# Patient Record
Sex: FEMALE | Race: WHITE | NOT HISPANIC OR LATINO | Employment: OTHER | URBAN - METROPOLITAN AREA
[De-identification: names, ages, dates, MRNs, and addresses within clinical notes are randomized per-mention and may not be internally consistent; named-entity substitution may affect disease eponyms.]

---

## 2017-04-04 ENCOUNTER — TRANSCRIBE ORDERS (OUTPATIENT)
Dept: ADMINISTRATIVE | Facility: HOSPITAL | Age: 67
End: 2017-04-04

## 2017-04-04 DIAGNOSIS — R53.81 OTHER MALAISE: ICD-10-CM

## 2017-04-04 DIAGNOSIS — R06.09 OTHER FORMS OF DYSPNEA: Primary | ICD-10-CM

## 2017-04-04 DIAGNOSIS — R53.83 OTHER FATIGUE: ICD-10-CM

## 2017-04-05 ENCOUNTER — APPOINTMENT (OUTPATIENT)
Dept: GENERAL RADIOLOGY | Facility: HOSPITAL | Age: 67
End: 2017-04-05

## 2017-04-05 ENCOUNTER — HOSPITAL ENCOUNTER (EMERGENCY)
Facility: HOSPITAL | Age: 67
Discharge: HOME | End: 2017-04-05
Attending: EMERGENCY MEDICINE | Admitting: EMERGENCY MEDICINE

## 2017-04-05 VITALS
TEMPERATURE: 98.6 F | DIASTOLIC BLOOD PRESSURE: 77 MMHG | OXYGEN SATURATION: 94 % | HEART RATE: 87 BPM | HEIGHT: 67 IN | BODY MASS INDEX: 34.92 KG/M2 | RESPIRATION RATE: 14 BRPM | SYSTOLIC BLOOD PRESSURE: 133 MMHG | WEIGHT: 222.5 LBS

## 2017-04-05 DIAGNOSIS — R53.81 MALAISE AND FATIGUE: ICD-10-CM

## 2017-04-05 DIAGNOSIS — N39.0 URINARY TRACT INFECTION IN FEMALE: ICD-10-CM

## 2017-04-05 DIAGNOSIS — R06.02 SHORTNESS OF BREATH: Primary | ICD-10-CM

## 2017-04-05 DIAGNOSIS — R53.83 MALAISE AND FATIGUE: ICD-10-CM

## 2017-04-05 LAB
ALBUMIN SERPL-MCNC: 3.9 G/DL (ref 3.5–5.2)
ALBUMIN/GLOB SERPL: 1.2 G/DL
ALP SERPL-CCNC: 122 U/L (ref 40–129)
ALT SERPL W P-5'-P-CCNC: 17 U/L (ref 5–33)
ANION GAP SERPL CALCULATED.3IONS-SCNC: 14.7 MMOL/L
AST SERPL-CCNC: 19 U/L (ref 5–32)
BACTERIA UR QL AUTO: ABNORMAL /HPF
BASOPHILS # BLD AUTO: 0.06 10*3/MM3 (ref 0–0.2)
BASOPHILS NFR BLD AUTO: 0.5 % (ref 0–2)
BILIRUB SERPL-MCNC: 0.5 MG/DL (ref 0.2–1.2)
BILIRUB UR QL STRIP: NEGATIVE
BUN BLD-MCNC: 10 MG/DL (ref 8–23)
BUN/CREAT SERPL: 10.8 (ref 7–25)
CALCIUM SPEC-SCNC: 9.3 MG/DL (ref 8.8–10.5)
CHLORIDE SERPL-SCNC: 97 MMOL/L (ref 98–107)
CLARITY UR: CLEAR
CO2 SERPL-SCNC: 26.3 MMOL/L (ref 22–29)
COLOR UR: YELLOW
CREAT BLD-MCNC: 0.93 MG/DL (ref 0.57–1)
DEPRECATED RDW RBC AUTO: 39.7 FL (ref 37–54)
EOSINOPHIL # BLD AUTO: 0.14 10*3/MM3 (ref 0.1–0.3)
EOSINOPHIL NFR BLD AUTO: 1.1 % (ref 0–4)
ERYTHROCYTE [DISTWIDTH] IN BLOOD BY AUTOMATED COUNT: 12.3 % (ref 11.5–14.5)
GFR SERPL CREATININE-BSD FRML MDRD: 60 ML/MIN/1.73
GLOBULIN UR ELPH-MCNC: 3.3 GM/DL
GLUCOSE BLD-MCNC: 91 MG/DL (ref 65–99)
GLUCOSE UR STRIP-MCNC: NEGATIVE MG/DL
HCT VFR BLD AUTO: 44.4 % (ref 37–47)
HETEROPH AB SER QL LA: NEGATIVE
HGB BLD-MCNC: 15 G/DL (ref 12–16)
HGB UR QL STRIP.AUTO: NEGATIVE
HYALINE CASTS UR QL AUTO: ABNORMAL /LPF
IMM GRANULOCYTES # BLD: 0.08 10*3/MM3 (ref 0–0.03)
IMM GRANULOCYTES NFR BLD: 0.6 % (ref 0–0.5)
KETONES UR QL STRIP: NEGATIVE
LEUKOCYTE ESTERASE UR QL STRIP.AUTO: ABNORMAL
LYMPHOCYTES # BLD AUTO: 1.75 10*3/MM3 (ref 0.6–4.8)
LYMPHOCYTES NFR BLD AUTO: 13.4 % (ref 20–45)
MCH RBC QN AUTO: 29.8 PG (ref 27–31)
MCHC RBC AUTO-ENTMCNC: 33.8 G/DL (ref 31–37)
MCV RBC AUTO: 88.1 FL (ref 81–99)
MONOCYTES # BLD AUTO: 1.1 10*3/MM3 (ref 0–1)
MONOCYTES NFR BLD AUTO: 8.4 % (ref 3–8)
NEUTROPHILS # BLD AUTO: 9.89 10*3/MM3 (ref 1.5–8.3)
NEUTROPHILS NFR BLD AUTO: 76 % (ref 45–70)
NITRITE UR QL STRIP: NEGATIVE
NRBC BLD MANUAL-RTO: 0 /100 WBC (ref 0–0)
NT-PROBNP SERPL-MCNC: 163.3 PG/ML (ref 5–125)
PH UR STRIP.AUTO: 7 [PH] (ref 4.5–8)
PLATELET # BLD AUTO: 358 10*3/MM3 (ref 140–500)
PMV BLD AUTO: 9.5 FL (ref 7.4–10.4)
POTASSIUM BLD-SCNC: 3.4 MMOL/L (ref 3.5–5.2)
PROT SERPL-MCNC: 7.2 G/DL (ref 6–8.5)
PROT UR QL STRIP: NEGATIVE
RBC # BLD AUTO: 5.04 10*6/MM3 (ref 4.2–5.4)
RBC # UR: ABNORMAL /HPF
REF LAB TEST METHOD: ABNORMAL
SODIUM BLD-SCNC: 138 MMOL/L (ref 136–145)
SP GR UR STRIP: 1.01 (ref 1–1.03)
SQUAMOUS #/AREA URNS HPF: ABNORMAL /HPF
T4 SERPL-MCNC: 10.02 MCG/DL (ref 4.5–11.7)
TROPONIN T SERPL-MCNC: <0.01 NG/ML (ref 0–0.03)
TSH SERPL DL<=0.05 MIU/L-ACNC: 2.17 MIU/ML (ref 0.27–4.2)
UROBILINOGEN UR QL STRIP: ABNORMAL
WBC NRBC COR # BLD: 13.02 10*3/MM3 (ref 4.8–10.8)
WBC UR QL AUTO: ABNORMAL /HPF

## 2017-04-05 PROCEDURE — 84443 ASSAY THYROID STIM HORMONE: CPT | Performed by: EMERGENCY MEDICINE

## 2017-04-05 PROCEDURE — 93010 ELECTROCARDIOGRAM REPORT: CPT | Performed by: INTERNAL MEDICINE

## 2017-04-05 PROCEDURE — 84436 ASSAY OF TOTAL THYROXINE: CPT | Performed by: EMERGENCY MEDICINE

## 2017-04-05 PROCEDURE — 71020 HC CHEST PA AND LATERAL: CPT

## 2017-04-05 PROCEDURE — 81001 URINALYSIS AUTO W/SCOPE: CPT | Performed by: EMERGENCY MEDICINE

## 2017-04-05 PROCEDURE — 93005 ELECTROCARDIOGRAM TRACING: CPT | Performed by: EMERGENCY MEDICINE

## 2017-04-05 PROCEDURE — 83880 ASSAY OF NATRIURETIC PEPTIDE: CPT | Performed by: EMERGENCY MEDICINE

## 2017-04-05 PROCEDURE — 99285 EMERGENCY DEPT VISIT HI MDM: CPT | Performed by: EMERGENCY MEDICINE

## 2017-04-05 PROCEDURE — 86308 HETEROPHILE ANTIBODY SCREEN: CPT | Performed by: EMERGENCY MEDICINE

## 2017-04-05 PROCEDURE — 84484 ASSAY OF TROPONIN QUANT: CPT | Performed by: EMERGENCY MEDICINE

## 2017-04-05 PROCEDURE — 99283 EMERGENCY DEPT VISIT LOW MDM: CPT

## 2017-04-05 PROCEDURE — 85025 COMPLETE CBC W/AUTO DIFF WBC: CPT | Performed by: EMERGENCY MEDICINE

## 2017-04-05 PROCEDURE — 80053 COMPREHEN METABOLIC PANEL: CPT | Performed by: EMERGENCY MEDICINE

## 2017-04-05 RX ORDER — SULFAMETHOXAZOLE AND TRIMETHOPRIM 800; 160 MG/1; MG/1
1 TABLET ORAL 2 TIMES DAILY
Qty: 20 TABLET | Refills: 0 | Status: SHIPPED | OUTPATIENT
Start: 2017-04-05 | End: 2017-04-15

## 2017-04-05 RX ORDER — FLUTICASONE PROPIONATE 50 MCG
2 SPRAY, SUSPENSION (ML) NASAL DAILY
COMMUNITY
End: 2020-08-04

## 2017-04-05 RX ORDER — LORAZEPAM 1 MG/1
1 TABLET ORAL 2 TIMES DAILY
COMMUNITY
End: 2020-08-04

## 2017-04-05 RX ORDER — METOPROLOL SUCCINATE 50 MG/1
50 TABLET, EXTENDED RELEASE ORAL DAILY
COMMUNITY

## 2017-04-05 RX ORDER — MELOXICAM 15 MG/1
15 TABLET ORAL DAILY
COMMUNITY

## 2017-04-05 RX ORDER — INDAPAMIDE 1.25 MG/1
1.25 TABLET, FILM COATED ORAL EVERY MORNING
COMMUNITY

## 2017-04-05 RX ORDER — BUDESONIDE AND FORMOTEROL FUMARATE DIHYDRATE 160; 4.5 UG/1; UG/1
2 AEROSOL RESPIRATORY (INHALATION)
COMMUNITY
End: 2020-08-04

## 2017-04-05 RX ORDER — LOSARTAN POTASSIUM 100 MG/1
100 TABLET ORAL DAILY
COMMUNITY

## 2017-04-05 NOTE — ED PROVIDER NOTES
Subjective     History provided by:  Patient and spouse    History of Present Illness    · Chief complaint: Shortness of breath and fatigue    · Location: Shortness of breath in the lungs.  Generalized fatigue.    · Quality/Severity: Feeling like she's not getting enough air in.  Generalized fatigue.    · Timing/Onset: Shortness of breath started March 4.  Generalized fatigue as been present for a month and occurs midday to the rest of the day.    · Modifying Factors: Exertion and activity exacerbates shortness of breath.    · Associated symptoms: She reports associated fatigue, and occasionally feeling like her heart is racing.    · Narrative: The patient is a 66-year-old white female whose complaint is shortness of breath for the last month.  She's also been experiencing fatigue that starts about mid day and continues to the rest of the day.  She was initially seen by her PCP at the time Dr. Sanchez on March 4 and given a steroid shot in a course of by mouth Levaquin which did not help.  She saw Dr. Sanchez again on March 10 at which time he diagnosed her with panic attacks and dyspnea and prescribed her lorazepam.  The patient is since changed doctors to Ioana Celestin who saw her Friday a week ago and last Friday and prescribed her prednisone and Ceftin and Symbicort.  The patient states his symptoms have persisted.  She states that none of the doctors have done any x-rays or lab work to-date.    ED Triage Vitals   Temp Heart Rate Resp BP SpO2   04/05/17 1054 04/05/17 1054 04/05/17 1054 04/05/17 1054 04/05/17 1054   98.6 °F (37 °C) 100 16 150/88 94 %      Temp src Heart Rate Source Patient Position BP Location FiO2 (%)   04/05/17 1054 04/05/17 1054 04/05/17 1054 04/05/17 1054 --   Oral Monitor Lying Right arm        Review of Systems   Constitutional: Positive for diaphoresis and fatigue. Negative for activity change, appetite change, chills and fever.   HENT: Negative for congestion, dental problem, ear pain, hearing  loss, mouth sores, postnasal drip, rhinorrhea, sinus pressure, sore throat, trouble swallowing and voice change.    Eyes: Negative for photophobia, pain, discharge, redness and visual disturbance.   Respiratory: Positive for shortness of breath. Negative for cough, chest tightness, wheezing and stridor.    Cardiovascular: Positive for palpitations. Negative for chest pain and leg swelling.   Gastrointestinal: Negative for abdominal pain, diarrhea, nausea and vomiting.   Genitourinary: Negative for difficulty urinating, dysuria, flank pain, frequency, hematuria and urgency.   Musculoskeletal: Negative for arthralgias, back pain, gait problem, joint swelling, myalgias, neck pain and neck stiffness.   Skin: Negative for color change and rash.   Neurological: Positive for weakness. Negative for dizziness, tremors, seizures, syncope, facial asymmetry, speech difficulty, light-headedness, numbness and headaches.   Hematological: Negative for adenopathy.   Psychiatric/Behavioral: Negative.  Negative for confusion and decreased concentration. The patient is not nervous/anxious.        Past Medical History:   Diagnosis Date   • Anxiety    • History of ear infections    • Hyperlipidemia    • Hypertension    • Pneumonia        No Known Allergies    Past Surgical History:   Procedure Laterality Date   • CHOLECYSTECTOMY     • KNEE ARTHROSCOPY     • TONSILLECTOMY     • TUBAL ABDOMINAL LIGATION         History reviewed. No pertinent family history.    Social History     Social History   • Marital status:      Spouse name: N/A   • Number of children: N/A   • Years of education: N/A     Social History Main Topics   • Smoking status: Former Smoker   • Smokeless tobacco: None      Comment: quit 2006   • Alcohol use No   • Drug use: No   • Sexual activity: Yes     Other Topics Concern   • None     Social History Narrative   • None           Objective   Physical Exam   Constitutional: She is oriented to person, place, and time.  She appears well-developed and well-nourished. No distress.   HENT:   Head: Normocephalic and atraumatic.   Nose: Nose normal.   Mouth/Throat: Oropharynx is clear and moist. No oropharyngeal exudate.   Eyes: EOM are normal. Pupils are equal, round, and reactive to light. Right eye exhibits no discharge. Left eye exhibits no discharge. No scleral icterus.   Neck: Normal range of motion. Neck supple. No JVD present. No thyromegaly present.   Cardiovascular: Normal rate, regular rhythm and normal heart sounds.    No murmur heard.  Pulmonary/Chest: Effort normal and breath sounds normal. She has no wheezes. She has no rales. She exhibits no tenderness.   Patient has good air movement with a room air saturation of 97% and lungs clear to auscultation.   Abdominal: Soft. Bowel sounds are normal. She exhibits no distension. There is no tenderness.   Musculoskeletal: Normal range of motion. She exhibits no edema, tenderness or deformity.   Lymphadenopathy:     She has no cervical adenopathy.   Neurological: She is alert and oriented to person, place, and time. No cranial nerve deficit. Coordination normal.   No focal motor sensory deficit   Skin: Skin is warm and dry. No rash noted. She is not diaphoretic.   Psychiatric: She has a normal mood and affect. Her behavior is normal. Judgment and thought content normal.   Nursing note and vitals reviewed.      ECG 12 Lead    Date/Time: 4/5/2017 12:41 PM  Performed by: SUE SOTOMAYOR.  Authorized by: SUE SOTOMAYOR.   Comments: EKG performed 11:52, EKG read 11:54.  Normal sinus rhythms rate 87, normal axis, normal conduction, no acute ST segment elevation or depression consistent with ischemia, inverted T waves in leave the one through V3, no ectopy, normal [place] and QT intervals.               ED Course  ED Course                  MDM  Number of Diagnoses or Management Options  Malaise and fatigue: new and requires workup  Shortness of breath: new and requires workup  Urinary tract  infection in female: new and requires workup     Amount and/or Complexity of Data Reviewed  Clinical lab tests: ordered and reviewed  Tests in the radiology section of CPT®: ordered and reviewed  Independent visualization of images, tracings, or specimens: yes    Risk of Complications, Morbidity, and/or Mortality  Presenting problems: high  Diagnostic procedures: high  Management options: high  General comments: My differential diagnosis for dyspnea includes but is not limited to:  Asthma, COPD, pneumonia, pulmonary embolus, acute respiratory distress syndrome, pneumothorax, pleural effusion, pulmonary fibrosis, congestive heart failure, myocardial infarction, DKA, uremia, acidosis, sepsis, anemia, drug related, hyperventilation, CNS disease    Patient Progress  Patient progress: stable      Final diagnoses:   Shortness of breath   Malaise and fatigue   Urinary tract infection in female           Labs Reviewed   COMPREHENSIVE METABOLIC PANEL - Abnormal; Notable for the following:        Result Value    Potassium 3.4 (*)     Chloride 97 (*)     eGFR Non  Amer 60 (*)     All other components within normal limits   BNP (IN-HOUSE) - Abnormal; Notable for the following:     proBNP 163.3 (*)     All other components within normal limits    Narrative:     Among patients with dyspnea, NT-proBNP is highly sensitive for the detection of acute congestive heart failure. In addition NT-proBNP of <300 pg/ml effectively rules out acute congestive heart failure with 99% negative predictive value.   URINALYSIS W/ CULTURE IF INDICATED - Abnormal; Notable for the following:     Leuk Esterase, UA Trace (*)     All other components within normal limits   CBC WITH AUTO DIFFERENTIAL - Abnormal; Notable for the following:     WBC 13.02 (*)     Neutrophil % 76.0 (*)     Lymphocyte % 13.4 (*)     Monocyte % 8.4 (*)     Immature Grans % 0.6 (*)     Neutrophils, Absolute 9.89 (*)     Monocytes, Absolute 1.10 (*)     Immature Grans,  Absolute 0.08 (*)     All other components within normal limits   URINALYSIS, MICROSCOPIC ONLY - Abnormal; Notable for the following:     WBC, UA 3-5 (*)     Bacteria, UA Trace (*)     Squamous Epithelial Cells, UA 7-12 (*)     All other components within normal limits   TROPONIN (IN-HOUSE) - Normal    Narrative:     Troponin T Reference Ranges:  Less than 0.03 ng/mL:    Negative for AMI  0.03 to 0.09 ng/mL:      Indeterminant for AMI  Greater than 0.09 ng/mL: Positive for AMI   MONONUCLEOSIS SCREEN - Normal   TSH - Normal   T4 - Normal    Narrative:     The concentration of Total T4 in samples from pregnant women is erroneously low (20%) when measured using the access Total T4 Assay.  Erroneously low results could mask hyperthyroidism.  Do not use the Access Total T4 assay as the only marker for evaluating pregnant patients for thyroid disorders.   CBC AND DIFFERENTIAL    Narrative:     The following orders were created for panel order CBC & Differential.  Procedure                               Abnormality         Status                     ---------                               -----------         ------                     CBC Auto Differential[[id number]]         Abnormal            Final result                 Please view results for these tests on the individual orders.     XR Chest 2 View   ED Interpretation   1. Moderate hiatal hernia with probable passive atelectasis in the   medial right lung base. No consolidations.       This report was finalized on 4/5/2017 12:35 PM by Dr. Ioana Malik MD.          Final Result   1. Moderate hiatal hernia with probable passive atelectasis in the   medial right lung base. No consolidations.       This report was finalized on 4/5/2017 12:35 PM by Dr. Ioana Malik MD.                 Medication List      New Prescriptions          sulfamethoxazole-trimethoprim 800-160 MG per tablet   Commonly known as:  BACTRIM DS   Take 1 tablet by mouth 2 (Two) Times a Day for 10  days.                Victor Manuel Pereira MD  04/05/17 0523

## 2017-04-05 NOTE — ED NOTES
Patient saw Ioana Celestin NP in Dr. Major's office last Friday and had lab work drawn.     Mila Bacon RN  04/05/17 1111

## 2017-04-07 ENCOUNTER — HOSPITAL ENCOUNTER (OUTPATIENT)
Dept: CARDIOLOGY | Facility: HOSPITAL | Age: 67
Discharge: HOME | End: 2017-04-07
Admitting: NURSE PRACTITIONER

## 2017-04-07 DIAGNOSIS — R06.09 OTHER FORMS OF DYSPNEA: ICD-10-CM

## 2017-04-07 DIAGNOSIS — R53.83 OTHER FATIGUE: ICD-10-CM

## 2017-04-07 DIAGNOSIS — R53.81 OTHER MALAISE: ICD-10-CM

## 2017-04-07 LAB
BH CV STRESS BP STAGE 1: NORMAL
BH CV STRESS DURATION MIN STAGE 1: 1
BH CV STRESS DURATION SEC STAGE 1: 44
BH CV STRESS GRADE STAGE 1: 10
BH CV STRESS HR STAGE 1: 130
BH CV STRESS METS STAGE 1: 5
BH CV STRESS PROTOCOL 1: NORMAL
BH CV STRESS RECOVERY BP: NORMAL MMHG
BH CV STRESS RECOVERY HR: 104 BPM
BH CV STRESS SPEED STAGE 1: 1.7
BH CV STRESS STAGE 1: 1
MAXIMAL PREDICTED HEART RATE: 152 BPM
PERCENT MAX PREDICTED HR: 86.84 %
STRESS BASELINE BP: NORMAL MMHG
STRESS BASELINE HR: 100 BPM
STRESS O2 SAT REST: 94 %
STRESS PERCENT HR: 102 %
STRESS POST ESTIMATED WORKLOAD: 4 METS
STRESS POST EXERCISE DUR MIN: 1 MIN
STRESS POST EXERCISE DUR SEC: 44 SEC
STRESS POST PEAK BP: NORMAL MMHG
STRESS POST PEAK HR: 132 BPM
STRESS TARGET HR: 129 BPM

## 2017-04-07 PROCEDURE — 93016 CV STRESS TEST SUPVJ ONLY: CPT | Performed by: INTERNAL MEDICINE

## 2017-04-07 PROCEDURE — 93017 CV STRESS TEST TRACING ONLY: CPT

## 2017-04-07 PROCEDURE — 93018 CV STRESS TEST I&R ONLY: CPT | Performed by: INTERNAL MEDICINE

## 2018-02-26 ENCOUNTER — APPOINTMENT (OUTPATIENT)
Dept: WOMENS IMAGING | Facility: HOSPITAL | Age: 68
End: 2018-02-26

## 2018-02-26 PROCEDURE — 77067 SCR MAMMO BI INCL CAD: CPT | Performed by: RADIOLOGY

## 2018-03-04 DIAGNOSIS — Z87.891 HISTORY OF SMOKING 30 OR MORE PACK YEARS: ICD-10-CM

## 2018-03-04 DIAGNOSIS — Z12.2 ENCOUNTER FOR SCREENING FOR LUNG CANCER: Primary | ICD-10-CM

## 2018-03-05 ENCOUNTER — CLINICAL SUPPORT (OUTPATIENT)
Dept: OTHER | Facility: HOSPITAL | Age: 68
End: 2018-03-05

## 2018-03-05 ENCOUNTER — HOSPITAL ENCOUNTER (OUTPATIENT)
Dept: CT IMAGING | Facility: HOSPITAL | Age: 68
Discharge: HOME | End: 2018-03-05
Admitting: CLINICAL NURSE SPECIALIST

## 2018-03-05 DIAGNOSIS — Z12.2 ENCOUNTER FOR SCREENING FOR LUNG CANCER: ICD-10-CM

## 2018-03-05 DIAGNOSIS — Z87.891 HISTORY OF SMOKING 30 OR MORE PACK YEARS: ICD-10-CM

## 2018-03-05 DIAGNOSIS — Z12.2 SCREENING FOR MALIGNANT NEOPLASM OF RESPIRATORY ORGAN: ICD-10-CM

## 2018-03-05 PROCEDURE — G0296 VISIT TO DETERM LDCT ELIG: HCPCS | Performed by: CLINICAL NURSE SPECIALIST

## 2018-03-05 PROCEDURE — G0297 LDCT FOR LUNG CA SCREEN: HCPCS

## 2018-03-05 RX ORDER — OLANZAPINE 2.5 MG/1
TABLET ORAL
COMMUNITY
Start: 2018-02-03 | End: 2021-10-11 | Stop reason: SDUPTHER

## 2018-03-12 ENCOUNTER — APPOINTMENT (OUTPATIENT)
Dept: OTHER | Facility: HOSPITAL | Age: 68
End: 2018-03-12

## 2018-04-16 VITALS
BODY MASS INDEX: 35.37 KG/M2 | HEIGHT: 68 IN | WEIGHT: 233.4 LBS | HEART RATE: 80 BPM | DIASTOLIC BLOOD PRESSURE: 98 MMHG | RESPIRATION RATE: 18 BRPM | SYSTOLIC BLOOD PRESSURE: 168 MMHG

## 2018-04-16 NOTE — PROGRESS NOTES
[facility] Low-Dose Lung Cancer CT Screening Visit    Gisella Alaniz is a pleasant 67 y.o. female seen today at the request of Dr. Evin Major in our Lung Cancer Screening Program, being seen for Lung Cancer Screening Counseling and a Shared Decision Making Visit prior to Low-Dose Lung Cancer Screening CT exam.    SMOKING HISTORY:   History   Smoking Status   • Former Smoker   • Types: Cigarettes   • Quit date: 3/16/2006   Smokeless Tobacco   • Never Used     Comment: Former smoker quit 12 years ago.  Smoked 1.5 ppd for 20 years and 1 ppd for 19 years for a 49 pack year history.       Gisella Alaniz is a former smoker quitting 12 years ago with a 49 pack year history.  Reports no use of alternate forms of tobacco, electronic cigarettes, marijuana or other substances.  Based on the recommendation of the [place] Preventive Services Task Force, this patient is at high risk for lung cancer and a low-dose CT screening scan is recommended.     We discussed the connection between Radon and Lung Cancer and the availability of free test kits.  Her home has tested negative for Radon in the past.  The patient reports NO occupational exposure to asbestos or other substances known to increase risk for lung cancer development.  Some second-hand smoke exposure as a child with her father smoking in their home.  No current second-hand exposure.    The patient has had no hemoptysis, unintentional weight loss or increasing shortness of breath. The patient is asymptomatic and has no signs or symptoms of lung cancer.   The patient reports NO personal history of cancer.  Additionally, reports NO family history of lung cancer.   She has NO known COPD.    Together we discussed the potential benefits and potential harms of being screened for lung cancer including the potential for follow-up diagnostic testing, risk for over diagnosis, false positive rate and total radiation exposure using the [place] LEADS  Collaborative standardized decision aid.  We reviewed the patient's smoking history and counseled on the importance and health benefits of maintaining cigarette smoking abstinence.      Smoking Abstinence  DISCUSSION HELD TODAY:     We discussed that there are a number of resources and tobacco cessation interventions to assist with smoking cessation including the 1-800-Quit Now line, Health Department programs, [place] Cancer Program resources, and use of the U.[place] Department of Health and Human Services five keys for quitting and quit plan worksheet. On a scale of zero to ten, the patient rates their motivation to stay quit at a 10 out of 10 today.  The patient is confident that they will never smoke in the future.      Recommendations for continued lung cancer screening:      We discussed the NCCN guidelines for lung cancer screening and the patient verbalized understanding that annual screening is recommended until fifteen years beyond smoking as long as they have no other disease or comorbidity that would prevent them from receiving cancer treatments such as surgery should a lung cancer be detected. The [facility] Lung Cancer Screening Shared Decision-Making Tool was utilized as an aid in discussing whether or not screening was right. The patient has decided to proceed with a Low Dose Lung Cancer Screening CT today. The patient is aware that the results of his screening will be shared with the referring provider or PCP as well.       The patient verbalizes understanding that results of this low dose lung CT exam will be called and that assistance will be provided in arranging any necessary follow-up.    After review of the NCCN guidelines and recommendations for ongoing screening, the patient verbalized understanding of recommendations for follow-up. We discussed the importance of adherence to continued annual screening until 15 years beyond smoking or until other life-limiting comorbidities are present. We  discussed the impact of comorbidities and ability and willing to undergo diagnosis and treatment.    The patient was counseled on the continued health benefits of having quit tobacco, maintaining smoking abstinence, smoking cessation strategies and resources, as well as the importance of adherence to annual lung cancer low-dose CT screening.    Shanice Landis, MSN, APRN, ACNS-BC, AOCN, CHPN  Clinical Nurse Specialist  [facility]

## 2019-06-27 ENCOUNTER — APPOINTMENT (OUTPATIENT)
Dept: WOMENS IMAGING | Facility: HOSPITAL | Age: 69
End: 2019-06-27

## 2019-06-27 PROCEDURE — 77067 SCR MAMMO BI INCL CAD: CPT | Performed by: RADIOLOGY

## 2019-06-27 PROCEDURE — 77063 BREAST TOMOSYNTHESIS BI: CPT | Performed by: RADIOLOGY

## 2020-01-17 ENCOUNTER — TELEPHONE (OUTPATIENT)
Dept: GASTROENTEROLOGY | Facility: CLINIC | Age: 70
End: 2020-01-17

## 2020-01-17 NOTE — TELEPHONE ENCOUNTER
No because im just now hearing about it if her PCP has been treating then they can order. im fine seeing her but not sure when that will be

## 2020-01-23 ENCOUNTER — OFFICE VISIT (OUTPATIENT)
Dept: GASTROENTEROLOGY | Facility: CLINIC | Age: 70
End: 2020-01-23

## 2020-01-23 VITALS
HEIGHT: 67 IN | SYSTOLIC BLOOD PRESSURE: 128 MMHG | BODY MASS INDEX: 34.37 KG/M2 | DIASTOLIC BLOOD PRESSURE: 68 MMHG | WEIGHT: 219 LBS

## 2020-01-23 DIAGNOSIS — Z86.010 PERSONAL HISTORY OF COLONIC POLYPS: ICD-10-CM

## 2020-01-23 DIAGNOSIS — R10.13 EPIGASTRIC PAIN: Primary | ICD-10-CM

## 2020-01-23 DIAGNOSIS — R10.32 LEFT LOWER QUADRANT ABDOMINAL PAIN: ICD-10-CM

## 2020-01-23 DIAGNOSIS — K44.9 HIATAL HERNIA: ICD-10-CM

## 2020-01-23 PROBLEM — Z86.0100 PERSONAL HISTORY OF COLONIC POLYPS: Status: ACTIVE | Noted: 2020-01-23

## 2020-01-23 PROCEDURE — 99204 OFFICE O/P NEW MOD 45 MIN: CPT | Performed by: INTERNAL MEDICINE

## 2020-01-23 RX ORDER — HYOSCYAMINE SULFATE 0.125 MG
TABLET ORAL
COMMUNITY
Start: 2019-11-25 | End: 2021-10-11

## 2020-01-23 RX ORDER — PANTOPRAZOLE SODIUM 40 MG/1
40 TABLET, DELAYED RELEASE ORAL DAILY
Qty: 90 TABLET | Refills: 3 | Status: SHIPPED | OUTPATIENT
Start: 2020-01-23 | End: 2021-10-11 | Stop reason: SDUPTHER

## 2020-01-23 NOTE — PROGRESS NOTES
"    PATIENT INFORMATION  Gisella Alaniz       - 1950    CHIEF COMPLAINT  Chief Complaint   Patient presents with   • Abdominal Pain       HISTORY OF PRESENT ILLNESS  Her bout of Diverticulitis just after gi took 10 days and then repeated right away afte an office visit and never feels like she didn't clear but then was treated for a sinus infection so that wa in the first week / days of January and finished on the  of this month.     Presetnly is trying to go 3-8 times each AM and then again after she eats. No noctrurnal events and no Probiotics. Not all diarrhea but some \"normal BMs\"    Says she has had 2 colonoscopy in the past but the most recent > 11 years ago and had polyps per her history but no path or procedure reports available          REVIEW OF SYSTEMS  Review of Systems   Constitutional: Positive for appetite change and fatigue.   HENT: Positive for congestion, postnasal drip and sneezing.    Respiratory: Positive for cough.    Gastrointestinal: Positive for abdominal distention and constipation.   Genitourinary: Positive for pelvic pain.   Musculoskeletal: Positive for back pain.   All other systems reviewed and are negative.        ACTIVE PROBLEMS  There are no active problems to display for this patient.        PAST MEDICAL HISTORY  Past Medical History:   Diagnosis Date   • Anxiety    • Colon polyp    • History of ear infections    • Hyperlipidemia    • Hypertension    • Pneumonia          SURGICAL HISTORY  Past Surgical History:   Procedure Laterality Date   • CHOLECYSTECTOMY     • COLONOSCOPY     • KNEE ARTHROSCOPY     • TONSILLECTOMY     • TUBAL ABDOMINAL LIGATION           FAMILY HISTORY  Family History   Problem Relation Age of Onset   • Colon cancer Neg Hx    • Colon polyps Neg Hx    • Esophageal cancer Neg Hx    • Liver cancer Neg Hx    • Rectal cancer Neg Hx    • Stomach cancer Neg Hx          SOCIAL HISTORY  Social History     Occupational History   • Not on file " "  Tobacco Use   • Smoking status: Former Smoker     Types: Cigarettes     Last attempt to quit: 3/16/2006     Years since quittin.8   • Smokeless tobacco: Never Used   • Tobacco comment: Former smoker quit 12 years ago.  Smoked 1.5 ppd for 20 years and 1 ppd for 19 years for a 49 pack year history.   Substance and Sexual Activity   • Alcohol use: No   • Drug use: No   • Sexual activity: Yes       Debilities/Disabilities Identified: None    Emotional Behavior: Appropriate    CURRENT MEDICATIONS    Current Outpatient Medications:   •  budesonide-formoterol (SYMBICORT) 160-4.5 MCG/ACT inhaler, Inhale 2 puffs 2 (Two) Times a Day., Disp: , Rfl:   •  fluticasone (FLONASE) 50 MCG/ACT nasal spray, 2 sprays into each nostril Daily., Disp: , Rfl:   •  hyoscyamine (ANASPAZ,LEVSIN) 0.125 MG tablet, , Disp: , Rfl:   •  indapamide (LOZOL) 1.25 MG tablet, Take 1.25 mg by mouth Every Morning., Disp: , Rfl:   •  LORazepam (ATIVAN) 1 MG tablet, Take 1 mg by mouth 2 (Two) Times a Day. PT TOOK A HALF TABLET THIS MORNING, Disp: , Rfl:   •  losartan (COZAAR) 100 MG tablet, Take 100 mg by mouth Daily., Disp: , Rfl:   •  meloxicam (MOBIC) 15 MG tablet, Take 15 mg by mouth Daily., Disp: , Rfl:   •  metoprolol succinate XL (TOPROL-XL) 50 MG 24 hr tablet, Take 50 mg by mouth Daily., Disp: , Rfl:   •  OLANZapine (zyPREXA) 2.5 MG tablet, , Disp: , Rfl:   •  pantoprazole (PROTONIX) 40 MG EC tablet, Take 1 tablet by mouth Daily., Disp: 90 tablet, Rfl: 3    ALLERGIES  Patient has no known allergies.    VITALS  Vitals:    20 1019   BP: 128/68   Weight: 99.3 kg (219 lb)   Height: 170.2 cm (67\")       LAST RESULTS   Hospital Outpatient Visit on 2017   Component Date Value Ref Range Status   •  CV STRESS PROTOCOL 1 2017 Sudhir   Final   • Stage 1 2017 1   Final   • HR Stage 1 2017 130   Final   • BP Stage 1 2017 169/79   Final   • Duration Min Stage 1 2017 1   Final   • Duration Sec Stage 1 2017 44 "   Final   • Grade Stage 1 04/07/2017 10   Final   • Speed Stage 1 04/07/2017 1.7   Final   • BH CV STRESS METS STAGE 1 04/07/2017 5   Final   • Baseline HR 04/07/2017 100  bpm Final   • Baseline BP 04/07/2017 126/82  mmHg Final   • O2 sat rest 04/07/2017 94  % Final   • Peak HR 04/07/2017 132  bpm Final   • Percent Max Pred HR 04/07/2017 86.84  % Final   • Percent Target HR 04/07/2017 102  % Final   • Peak BP 04/07/2017 169/82  mmHg Final   • Recovery HR 04/07/2017 104  bpm Final   • Recovery BP 04/07/2017 132/81  mmHg Final   • Target HR (85%) 04/07/2017 129  bpm Final   • Max. Pred. HR (100%) 04/07/2017 152  bpm Final   • Exercise duration (min) 04/07/2017 1  min Final   • Exercise duration (sec) 04/07/2017 44  sec Final   • Estimated workload 04/07/2017 4.0  METS Final     No results found.    PHYSICAL EXAM  Physical Exam   Constitutional: She is oriented to person, place, and time. She appears well-developed and well-nourished.   Eyes: Pupils are equal, round, and reactive to light. Conjunctivae and EOM are normal. No scleral icterus.   Neck: Normal range of motion. Neck supple. No thyromegaly present.   Cardiovascular: Normal rate, regular rhythm, normal heart sounds and intact distal pulses. Exam reveals no gallop.   No murmur heard.  Pulmonary/Chest: Effort normal and breath sounds normal. She has no wheezes. She has no rales.   Abdominal: Soft. Bowel sounds are normal. She exhibits no shifting dullness, no distension, no fluid wave, no abdominal bruit, no ascites and no mass. There is no hepatosplenomegaly. There is tenderness in the epigastric area and left lower quadrant. There is no guarding and negative Bhatia's sign. Hernia confirmed negative in the ventral area.   Musculoskeletal: Normal range of motion. She exhibits no edema.   Lymphadenopathy:     She has no cervical adenopathy.   Neurological: She is alert and oriented to person, place, and time.   Skin: Skin is warm and dry. No rash noted. She is  not diaphoretic. No erythema.   Psychiatric: She has a normal mood and affect. Her behavior is normal.       ASSESSMENT  Diagnoses and all orders for this visit:    Epigastric pain  -     Case Request; Standing  -     Case Request    Left lower quadrant abdominal pain    Personal history of colonic polyps  -     Case Request; Standing  -     Case Request    Hiatal hernia  -     Case Request; Standing  -     Case Request    Other orders  -     hyoscyamine (ANASPAZ,LEVSIN) 0.125 MG tablet  -     pantoprazole (PROTONIX) 40 MG EC tablet; Take 1 tablet by mouth Daily.  -     Follow Anesthesia Guidelines / Standing Orders; Future  -     Obtain Informed Consent; Future  -     Obtain Informed Consent; Standing          PLAN  Will resume a PPI for her chronic NSAID usage    Return in about 2 months (around 3/23/2020).

## 2020-04-01 ENCOUNTER — TELEPHONE (OUTPATIENT)
Dept: GASTROENTEROLOGY | Facility: CLINIC | Age: 70
End: 2020-04-01

## 2020-04-01 NOTE — TELEPHONE ENCOUNTER
CALLED AND SPOKE WITH PATIENT.  EXPLAINED NEED TO DELAY HER PROCEDURES AT THIS TIME.  WILL CALL BACK AT LATER DATE TO RESCHEDULE.  SHE UNDERSTANDS.

## 2020-06-04 ENCOUNTER — TELEPHONE (OUTPATIENT)
Dept: GASTROENTEROLOGY | Facility: CLINIC | Age: 70
End: 2020-06-04

## 2020-06-04 NOTE — TELEPHONE ENCOUNTER
CALLED AND SPOKE WITH PATIENT.  SCHEDULED AT [place] 08/05/2020 AT 12:30PM - ARRIVE 11:30AM.  WILL MAIL INSTRUCTIONS.

## 2020-07-29 ENCOUNTER — TRANSCRIBE ORDERS (OUTPATIENT)
Dept: ADMINISTRATIVE | Facility: HOSPITAL | Age: 70
End: 2020-07-29

## 2020-07-29 DIAGNOSIS — Z01.818 PREOP EXAMINATION: Primary | ICD-10-CM

## 2020-08-03 ENCOUNTER — LAB (OUTPATIENT)
Dept: LAB | Facility: HOSPITAL | Age: 70
End: 2020-08-03

## 2020-08-03 DIAGNOSIS — Z01.818 PREOP EXAMINATION: ICD-10-CM

## 2020-08-03 PROCEDURE — U0002 COVID-19 LAB TEST NON-CDC: HCPCS

## 2020-08-03 PROCEDURE — C9803 HOPD COVID-19 SPEC COLLECT: HCPCS

## 2020-08-03 PROCEDURE — U0004 COV-19 TEST NON-CDC HGH THRU: HCPCS

## 2020-08-04 ENCOUNTER — ANESTHESIA EVENT (OUTPATIENT)
Dept: PERIOP | Facility: HOSPITAL | Age: 70
End: 2020-08-04

## 2020-08-04 LAB
REF LAB TEST METHOD: NORMAL
SARS-COV-2 RNA RESP QL NAA+PROBE: NOT DETECTED

## 2020-08-05 ENCOUNTER — ANESTHESIA (OUTPATIENT)
Dept: PERIOP | Facility: HOSPITAL | Age: 70
End: 2020-08-05

## 2020-08-05 ENCOUNTER — HOSPITAL ENCOUNTER (OUTPATIENT)
Facility: HOSPITAL | Age: 70
Setting detail: HOSPITAL OUTPATIENT SURGERY
Discharge: HOME | End: 2020-08-05
Attending: INTERNAL MEDICINE | Admitting: INTERNAL MEDICINE

## 2020-08-05 VITALS
TEMPERATURE: 98 F | HEIGHT: 67 IN | BODY MASS INDEX: 33.4 KG/M2 | SYSTOLIC BLOOD PRESSURE: 131 MMHG | DIASTOLIC BLOOD PRESSURE: 80 MMHG | WEIGHT: 212.8 LBS | RESPIRATION RATE: 18 BRPM | HEART RATE: 95 BPM | OXYGEN SATURATION: 96 %

## 2020-08-05 DIAGNOSIS — Z86.010 PERSONAL HISTORY OF COLONIC POLYPS: ICD-10-CM

## 2020-08-05 DIAGNOSIS — K44.9 HIATAL HERNIA: ICD-10-CM

## 2020-08-05 DIAGNOSIS — R10.13 EPIGASTRIC PAIN: ICD-10-CM

## 2020-08-05 PROCEDURE — 93005 ELECTROCARDIOGRAM TRACING: CPT | Performed by: NURSE ANESTHETIST, CERTIFIED REGISTERED

## 2020-08-05 PROCEDURE — 88305 TISSUE EXAM BY PATHOLOGIST: CPT | Performed by: INTERNAL MEDICINE

## 2020-08-05 PROCEDURE — 25010000002 PROPOFOL 10 MG/ML EMULSION: Performed by: NURSE ANESTHETIST, CERTIFIED REGISTERED

## 2020-08-05 PROCEDURE — 43239 EGD BIOPSY SINGLE/MULTIPLE: CPT | Performed by: INTERNAL MEDICINE

## 2020-08-05 PROCEDURE — 45380 COLONOSCOPY AND BIOPSY: CPT | Performed by: INTERNAL MEDICINE

## 2020-08-05 PROCEDURE — 45385 COLONOSCOPY W/LESION REMOVAL: CPT | Performed by: INTERNAL MEDICINE

## 2020-08-05 PROCEDURE — C1726 CATH, BAL DIL, NON-VASCULAR: HCPCS | Performed by: INTERNAL MEDICINE

## 2020-08-05 PROCEDURE — 43249 ESOPH EGD DILATION <30 MM: CPT | Performed by: INTERNAL MEDICINE

## 2020-08-05 PROCEDURE — 93010 ELECTROCARDIOGRAM REPORT: CPT | Performed by: INTERNAL MEDICINE

## 2020-08-05 RX ORDER — SODIUM CHLORIDE 0.9 % (FLUSH) 0.9 %
10 SYRINGE (ML) INJECTION AS NEEDED
Status: DISCONTINUED | OUTPATIENT
Start: 2020-08-05 | End: 2020-08-05 | Stop reason: HOSPADM

## 2020-08-05 RX ORDER — LIDOCAINE HYDROCHLORIDE 10 MG/ML
0.5 INJECTION, SOLUTION EPIDURAL; INFILTRATION; INTRACAUDAL; PERINEURAL ONCE AS NEEDED
Status: DISCONTINUED | OUTPATIENT
Start: 2020-08-05 | End: 2020-08-05 | Stop reason: HOSPADM

## 2020-08-05 RX ORDER — SODIUM CHLORIDE, SODIUM LACTATE, POTASSIUM CHLORIDE, CALCIUM CHLORIDE 600; 310; 30; 20 MG/100ML; MG/100ML; MG/100ML; MG/100ML
100 INJECTION, SOLUTION INTRAVENOUS CONTINUOUS
Status: DISCONTINUED | OUTPATIENT
Start: 2020-08-05 | End: 2020-08-05 | Stop reason: HOSPADM

## 2020-08-05 RX ORDER — SUCRALFATE 1 G/1
1 TABLET ORAL 4 TIMES DAILY
Qty: 120 TABLET | Refills: 11 | Status: SHIPPED | OUTPATIENT
Start: 2020-08-05

## 2020-08-05 RX ORDER — SODIUM CHLORIDE, SODIUM LACTATE, POTASSIUM CHLORIDE, CALCIUM CHLORIDE 600; 310; 30; 20 MG/100ML; MG/100ML; MG/100ML; MG/100ML
9 INJECTION, SOLUTION INTRAVENOUS CONTINUOUS PRN
Status: DISCONTINUED | OUTPATIENT
Start: 2020-08-05 | End: 2020-08-05 | Stop reason: HOSPADM

## 2020-08-05 RX ORDER — ONDANSETRON 2 MG/ML
4 INJECTION INTRAMUSCULAR; INTRAVENOUS ONCE AS NEEDED
Status: DISCONTINUED | OUTPATIENT
Start: 2020-08-05 | End: 2020-08-05 | Stop reason: HOSPADM

## 2020-08-05 RX ORDER — DEXMEDETOMIDINE HYDROCHLORIDE 100 UG/ML
INJECTION, SOLUTION INTRAVENOUS AS NEEDED
Status: DISCONTINUED | OUTPATIENT
Start: 2020-08-05 | End: 2020-08-05 | Stop reason: SURG

## 2020-08-05 RX ORDER — PROPOFOL 10 MG/ML
VIAL (ML) INTRAVENOUS AS NEEDED
Status: DISCONTINUED | OUTPATIENT
Start: 2020-08-05 | End: 2020-08-05 | Stop reason: SURG

## 2020-08-05 RX ORDER — GLYCOPYRROLATE 0.2 MG/ML
INJECTION INTRAMUSCULAR; INTRAVENOUS AS NEEDED
Status: DISCONTINUED | OUTPATIENT
Start: 2020-08-05 | End: 2020-08-05 | Stop reason: SURG

## 2020-08-05 RX ORDER — SULFAMETHOXAZOLE AND TRIMETHOPRIM 800; 160 MG/1; MG/1
1 TABLET ORAL 2 TIMES DAILY
Qty: 20 TABLET | Refills: 1 | Status: SHIPPED | OUTPATIENT
Start: 2020-08-05 | End: 2020-08-15

## 2020-08-05 RX ORDER — SODIUM CHLORIDE 0.9 % (FLUSH) 0.9 %
10 SYRINGE (ML) INJECTION EVERY 12 HOURS SCHEDULED
Status: DISCONTINUED | OUTPATIENT
Start: 2020-08-05 | End: 2020-08-05 | Stop reason: HOSPADM

## 2020-08-05 RX ORDER — SODIUM CHLORIDE 9 MG/ML
40 INJECTION, SOLUTION INTRAVENOUS AS NEEDED
Status: DISCONTINUED | OUTPATIENT
Start: 2020-08-05 | End: 2020-08-05 | Stop reason: HOSPADM

## 2020-08-05 RX ADMIN — PROPOFOL 100 MG: 10 INJECTION, EMULSION INTRAVENOUS at 13:17

## 2020-08-05 RX ADMIN — PROPOFOL 50 MG: 10 INJECTION, EMULSION INTRAVENOUS at 13:25

## 2020-08-05 RX ADMIN — SODIUM CHLORIDE, POTASSIUM CHLORIDE, SODIUM LACTATE AND CALCIUM CHLORIDE 9 ML/HR: 600; 310; 30; 20 INJECTION, SOLUTION INTRAVENOUS at 12:06

## 2020-08-05 RX ADMIN — PROPOFOL 50 MG: 10 INJECTION, EMULSION INTRAVENOUS at 13:44

## 2020-08-05 RX ADMIN — DEXMEDETOMIDINE HYDROCHLORIDE 20 MCG: 100 INJECTION, SOLUTION, CONCENTRATE INTRAVENOUS at 13:09

## 2020-08-05 RX ADMIN — PROPOFOL 100 MG: 10 INJECTION, EMULSION INTRAVENOUS at 13:12

## 2020-08-05 RX ADMIN — PROPOFOL 50 MG: 10 INJECTION, EMULSION INTRAVENOUS at 13:32

## 2020-08-05 RX ADMIN — GLYCOPYRROLATE 0.1 MG: 0.2 INJECTION INTRAMUSCULAR; INTRAVENOUS at 13:09

## 2020-08-05 RX ADMIN — PROPOFOL 50 MG: 10 INJECTION, EMULSION INTRAVENOUS at 13:28

## 2020-08-05 RX ADMIN — PROPOFOL 50 MG: 10 INJECTION, EMULSION INTRAVENOUS at 13:39

## 2020-08-05 RX ADMIN — PROPOFOL 50 MG: 10 INJECTION, EMULSION INTRAVENOUS at 13:35

## 2020-08-05 NOTE — ANESTHESIA POSTPROCEDURE EVALUATION
Patient: Gisella Alaniz    Procedure Summary     Date:  08/05/20 Room / Location:  [facility] ENDOSCOPY 1 /  [facility]    Anesthesia Start:  1304 Anesthesia Stop:  1352    Procedures:       ESOPHAGOGASTRODUODENOSCOPY WITH BIOPSIES AND DILATION (N/A Esophagus)      COLONOSCOPY; POLYPECTOMY (N/A ) Diagnosis:       Epigastric pain      Personal history of colonic polyps      Hiatal hernia      Esophageal stricture      Aneudy lesion, chronic      Gastritis and duodenitis      Diverticulitis large intestine      Colon polyp      (Epigastric pain [R10.13])      (Personal history of colonic polyps [Z86.010])      (Hiatal hernia [K44.9])    Surgeon:  Devon Jeronimo MD Provider:  Genaro Gant CRNA    Anesthesia Type:  MAC ASA Status:  2          Anesthesia Type: MAC    Vitals  Vitals Value Taken Time   /94 8/5/2020  2:11 PM   Temp     Pulse 90 8/5/2020  2:11 PM   Resp 18 8/5/2020  2:11 PM   SpO2 95 % 8/5/2020  2:11 PM           Post Anesthesia Care and Evaluation    Patient location during evaluation: PHASE II  Patient participation: complete - patient participated  Level of consciousness: awake  Pain score: 0  Pain management: adequate  Airway patency: patent  Anesthetic complications: No anesthetic complications  PONV Status: none  Cardiovascular status: acceptable  Respiratory status: acceptable  Hydration status: acceptable

## 2020-08-05 NOTE — H&P
Patient Care Team:  Evin Major MD as PCP - General (Family Medicine)  Shanice Landis APRN as Nurse Practitioner (Oncology)    CHIEF COMPLAINT: Screening CRC and dyspepsia    HISTORY OF PRESENT ILLNESS:  Last colon was >10 years, noo dysphagia nor Weight loss    Past Medical History:   Diagnosis Date   • Anxiety    • Colon polyp    • Hiatal hernia    • History of ear infections    • Hyperlipidemia    • Hypertension    • Pneumonia      Past Surgical History:   Procedure Laterality Date   • CHOLECYSTECTOMY     • COLONOSCOPY     • KNEE ARTHROSCOPY     • TONSILLECTOMY     • TUBAL ABDOMINAL LIGATION       Family History   Problem Relation Age of Onset   • Colon cancer Neg Hx    • Colon polyps Neg Hx    • Esophageal cancer Neg Hx    • Liver cancer Neg Hx    • Rectal cancer Neg Hx    • Stomach cancer Neg Hx      Social History     Tobacco Use   • Smoking status: Former Smoker     Types: Cigarettes     Last attempt to quit: 3/16/2006     Years since quittin.4   • Smokeless tobacco: Never Used   • Tobacco comment: Former smoker quit 12 years ago.  Smoked 1.5 ppd for 20 years and 1 ppd for 19 years for a 49 pack year history.   Substance Use Topics   • Alcohol use: No   • Drug use: No     Medications Prior to Admission   Medication Sig Dispense Refill Last Dose   • indapamide (LOZOL) 1.25 MG tablet Take 1.25 mg by mouth Every Morning.   2020 at Unknown time   • losartan (COZAAR) 100 MG tablet Take 100 mg by mouth Daily.   2020 at Unknown time   • meloxicam (MOBIC) 15 MG tablet Take 15 mg by mouth Daily.   Past Week at Unknown time   • metoprolol succinate XL (TOPROL-XL) 50 MG 24 hr tablet Take 50 mg by mouth Daily.   2020 at Unknown time   • OLANZapine (zyPREXA) 2.5 MG tablet    Past Week at Unknown time   • hyoscyamine (ANASPAZ,LEVSIN) 0.125 MG tablet       • pantoprazole (PROTONIX) 40 MG EC tablet Take 1 tablet by mouth Daily. 90 tablet 3      Allergies:  Patient has no known  "allergies.    REVIEW OF SYSTEMS:  Please see the above history of present illness for pertinent positives and negatives.  The remainder of the patient's systems have been reviewed and are negative.     Vital Signs  Temp:  [98 °F (36.7 °C)] 98 °F (36.7 °C)  Heart Rate:  [96] 96  Resp:  [19] 19  BP: (143)/(92) 143/92    Flowsheet Rows      First Filed Value   Admission Height  170.2 cm (67\") Documented at 08/04/2020 1136   Admission Weight  96.5 kg (212 lb 12.8 oz) Documented at 08/05/2020 1100           Physical Exam:  Physical Exam   Constitutional: Patient appears well-developed and well-nourished and in no acute distress   HEENT:   Head: Normocephalic and atraumatic.   Eyes:  Pupils are equal, round, and reactive to light. EOM are intact. Sclerae are anicteric and non-injected.  Mouth and Throat: Patient has moist mucous membranes. Oropharynx is clear of any erythema or exudate.     Neck: Neck supple. No JVD present. No thyromegaly present. No lymphadenopathy present.  Cardiovascular: Regular rate, regular rhythm, S1 normal and S2 normal.  Exam reveals no gallop and no friction rub.  No murmur heard.  Pulmonary/Chest: Lungs are clear to auscultation bilaterally. No respiratory distress. No wheezes. No rhonchi. No rales.   Abdominal: Soft. Bowel sounds are normal. No distension and no mass. There is no hepatosplenomegaly. There is no tenderness.   Musculoskeletal: Normal Muscle tone  Extremities: No edema. Pulses are palpable in all 4 extremities.  Neurological: Patient is alert and oriented to person, place, and time. Cranial nerves II-XII are grossly intact with no focal deficits.  Skin: Skin is warm. No rash noted. Nails show no clubbing.  No cyanosis or erythema.    Debilities/Disabilities Identified: None  Emotional Behavior: Appropriate     Results Review:    I reviewed the patient's new clinical results.  Lab Results (most recent)     None          Imaging Results (Most Recent)     None    "     reviewed    ECG/EMG Results (most recent)     Procedure Component Value Units Date/Time    ECG 12 Lead [[id number]] Collected:  08/05/20 1206     Updated:  08/05/20 1248    Narrative:       HEART RATE= 91  bpm  RR Interval= 660  ms  FL Interval= 156  ms  P Horizontal Axis= 7  deg  P Front Axis= 49  deg  QRSD Interval= 78  ms  QT Interval= 387  ms  QRS Axis= 36  deg  T Wave Axis= 34  deg  - ABNORMAL ECG -  Sinus rhythm  Low voltage, precordial leads  Nonspecific T abnormalities, anterior leads  When compared with ECG of 05-Apr-2017 11:52:31,  No significant change   Electronically Signed By: Chase Bryant ([facility]) 05-Aug-2020 12:46:53  Date and Time of Study: 2020-08-05 12:06:46        reviewed    Assessment/Plan   Screening CRC and dyspepsia/  EGD and colonoscopy      I discussed the patients findings and my recommendations with patient.     Devon Jeronimo MD  08/05/20  13:07    Time: 10 min prior to procedure.

## 2020-08-05 NOTE — ANESTHESIA PREPROCEDURE EVALUATION
Anesthesia Evaluation     Patient summary reviewed and Nursing notes reviewed   no history of anesthetic complications:  NPO Solid Status: > 8 hours  NPO Liquid Status: < 2 hours           Airway   Mallampati: II  TM distance: >3 FB  Neck ROM: full  No difficulty expected  Dental      Comment: Bridge and crowns upper teeth    Pulmonary - normal exam   (+) sleep apnea (snore),   Cardiovascular - normal exam  Exercise tolerance: good (4-7 METS)    Patient on routine beta blocker and Beta blocker given within 24 hours of surgery  Rhythm: regular  Rate: normal    (+) hypertension well controlled, CASTRO, hyperlipidemia,       Neuro/Psych  (+) psychiatric history Anxiety,     GI/Hepatic/Renal/Endo    (+) obesity,  hiatal hernia,      Musculoskeletal     Abdominal   (+) obese,    Substance History - negative use     OB/GYN          Other   arthritis,      ROS/Med Hx Other: ECG 12 Lead   Order: [id number]   Status:  Preliminary result   Visible to patient:  No (Not Released) Next appt:  None     Narrative & Impression       HEART RATE= 91  bpm  RR Interval= 660  ms  MD Interval= 156  ms  P Horizontal Axis= 7  deg  P Front Axis= 49  deg  QRSD Interval= 78  ms  QT Interval= 387  ms  QRS Axis= 36  deg  T Wave Axis= 34  deg  - ABNORMAL ECG -  Sinus rhythm  Low voltage, precordial leads  Nonspecific T abnormalities, anterior leads  Electronically Signed By:   Date and Time of Study: 2020-08-05 12:06:46    Specimen Collected: 08/05/20 12:06                        Anesthesia Plan    ASA 2     MAC       Anesthetic plan, all risks, benefits, and alternatives have been provided, discussed and informed consent has been obtained with: patient.  Use of blood products discussed with patient  Consented to blood products.

## 2020-08-05 NOTE — OP NOTE
ESOPHAGOGASTRODUODENOSCOPY, COLONOSCOPY  Procedure Report    Patient Name:  Gisella Alaniz  YOB: 1950    Date of Surgery:  8/5/2020     Indications:  Epigastric pain [R10.13]  Personal history of colonic polyps [Z86.010]  Hiatal hernia [K44.9]      Pre-op Diagnosis:   Epigastric pain [R10.13]  Personal history of colonic polyps [Z86.010]  Hiatal hernia [K44.9]    Post-Op Diagnosis Codes:     * Epigastric pain [R10.13]     * Personal history of colonic polyps [Z86.010]     * Hiatal hernia [K44.9]     * Esophageal stricture [K22.2]     * Aneudy lesion, chronic [K25.7]     * Gastritis and duodenitis [535]     * Diverticulitis large intestine [K57.32]     * Colon polyp [K63.5]         Procedure/CPT® Codes:      Procedure(s):  ESOPHAGOGASTRODUODENOSCOPY WITH BIOPSIES AND DILATION  COLONOSCOPY; POLYPECTOMY    Staff:  Surgeon(s):  Devon Jeronimo MD         Anesthesia: Monitored Anesthesia Care    Estimated Blood Loss: none    Specimens:   ID Type Source Tests Collected by Time   A : BX Tissue Small Intestine, Duodenum TISSUE PATHOLOGY EXAM Devon Jeronimo MD 8/5/2020 1324   B : BX Tissue Stomach TISSUE PATHOLOGY EXAM Devon Jeronimo MD 8/5/2020 1324   C : BX Tissue Esophagus, Distal TISSUE PATHOLOGY EXAM Devon Jeronimo MD 8/5/2020 1325   D :  Polyp Large Intestine, Right / Ascending Colon TISSUE PATHOLOGY EXAM Devon Jeronimo MD 8/5/2020 1337   E :  Polyp Large Intestine, Sigmoid Colon TISSUE PATHOLOGY EXAM Devon Jeronimo MD 8/5/2020 1342       Implants:    Nothing was implanted during the procedure      Description of Procedure: After having signed informed consent, she was brought to the endoscopy suite and placed in the left lateral decubitus position and given her IV sedation. A bite block was placed between her incisors. The scope was introduced into the oropharynx and advanced under direct visualization into the esophagus,  through the distal esophagus which was tortuous, into a large hiatal hernia. She had a visible Aneudy lesion. No bleeding. The scope was advanced into the stomach; however, she experienced a laryngospasm and was hypoxic. The scope was removed from the patient. Using positive pressure breathing this was quickly reversed. She was re-sedated and the scope was introduced in similar fashion back to the distal esophagus which did show evidence of esophageal stricture. The scope was able to pass. The scope was retroflexed within the hiatal hernia. There were no mucosal lesions noted within the cardia. The scope was deretroflexed and advanced into the abdominal stomach through to the antrum. There was mild patchy erythema in the antrum. The scope was advanced through the pylorus to the duodenal bulb which showed nonspecific duodenitis. The scope was advanced around the angle to 2nd and 3rd portion of the duodenum and there were nodules noted throughout the duodenal mucosa felt to represent extensive Brunner gland proliferation. Biopsies were taken of this. The scope was withdrawn to the antrum and biopsies were taken.  A balloon was passed. The scope was withdrawn into the distal esophagus. A balloon was passed across the esophageal stricture, inflated to 18 mm, and held for a minute. There was evidence of dilation without significant bleeding and no signs of perforation. Biopsies were taken of the GE junction as well and sent as distal esophageal biopsy. The scope was taken from the patient. She tolerated the procedure well.     The patient was kept in the left lateral decubitus position and repositioned in the room. Rectal exam revealed no external lesions, normal anal tone, and no rectal mass. The scope was introduced into the rectum and advanced under direct visualization with ease through the rectum, sigmoid colon, and past scattered diverticula. There was also a subjectively spastic area with erythematous changes  consistent with diverticulitis. The scope, however, could be passed through this area without significant resistance through the sigmoid, descending colon, to and around the splenic flexure, reduced and advanced through the transverse colon with some looping, to and around the hepatic flexure and reduced into the ascending colon. There was some formed stool in the proximal ascending colon and cecum. The cecum was identified by appendiceal orifice and ileocecal valve, but again, there was poor prep of this area. The ileocecal valve was not attempted to be intubated due to the residual stool that was present at the orifice. The scope was withdrawn examining the colon in a circumferential fashion. In the distal ascending colon was a diminutive polyp noted, biopsied and sent separately as ascending colon polyp. The scope was withdrawn around the hepatic flexure, transverse colon, descending colon and sigmoid colon. The area of inflammation and swelling with obvious stricture was noted between 20 and 30 cm and as the scope was withdrawn just past this there was an inflamed hyperplastic appearing polyp that was 8 to 9 mm in greatest diameter. It was removed with cold snare and sent separately as sigmoid colon polyp. The scope was withdrawn through the remainder of the sigmoid colon to the rectum which were otherwise normal appearing.  The scope was retroflexed revealing an intact dentate line and no mucosal lesions. The scope was deretroflexed and withdrawn. The patient tolerated the procedure well.             Findings: Moderate Duodenitis-Biopsy  Mild Gastritis-Biopsy  Hiatal Hernia  Camersons Lesions(Bx)  Esophageal Stricture-Dilated 18mm-Biopsy    Colon to Cecum Fair/Poor Prep  pancolonic Diverticulosis  [id number]  Polyps(2) Biopsy/Snare     Complications: None    Recommendations: Results to be called. and Repeat in 3 years, Add Carafate and treat for her Diverticulitis, consider stopping her NSAIDS  based on Biopsy      Devon Jeronimo MD     Date: 8/5/2020  Time: 13:52

## 2020-08-05 NOTE — BRIEF OP NOTE
ESOPHAGOGASTRODUODENOSCOPY, COLONOSCOPY  Progress Note    Gisella Alaniz  8/5/2020    Pre-op Diagnosis:   Epigastric pain [R10.13]  Personal history of colonic polyps [Z86.010]  Hiatal hernia [K44.9]       Post-Op Diagnosis Codes:     * Epigastric pain [R10.13]     * Personal history of colonic polyps [Z86.010]     * Hiatal hernia [K44.9]     * Esophageal stricture [K22.2]     * Aneudy lesion, chronic [K25.7]     * Gastritis and duodenitis [535]     * Diverticulitis large intestine [K57.32]     * Colon polyp [K63.5]    Procedure/CPT® Codes:        Procedure(s):  ESOPHAGOGASTRODUODENOSCOPY WITH BIOPSIES AND DILATION  COLONOSCOPY; POLYPECTOMY    Surgeon(s):  Devon Jeronimo MD    Anesthesia: Monitored Anesthesia Care    Staff:   Circulator: Marielos Ha RN  Scrub Person: Fatou Villatoro         Estimated Blood Loss: none    Urine Voided: * No values recorded between 8/5/2020  1:05 PM and 8/5/2020  1:47 PM *    Specimens:                Specimens     ID Source Type Tests Collected By Collected At Frozen?      A Small Intestine, Duodenum Tissue · TISSUE PATHOLOGY EXAM   Devon Jeronimo MD 8/5/20 1324      Description: BX    B Stomach Tissue · TISSUE PATHOLOGY EXAM   Devon Jeronimo MD 8/5/20 1324      Description: BX    C Esophagus, Distal Tissue · TISSUE PATHOLOGY EXAM   Devon Jeronimo MD 8/5/20 1325      Description: BX    D Large Intestine, Right / Ascending Colon Polyp · TISSUE PATHOLOGY EXAM   Devon Jeronimo MD 8/5/20 1337      E Large Intestine, Sigmoid Colon Polyp · TISSUE PATHOLOGY EXAM   Devon Jeronimo MD 8/5/20 1342      Comment: TRAP 1  COLD SNARE                Drains: * No LDAs found *    Findings: Moderate Duodenitis-Biopsy  Mild Gastritis-Biopsy  Hiatal Hernia  Camersons Lesions(Bx)  Esophageal Stricture-Dilated 18mm-Biopsy    Colon to Cecum Fair/Poor Prep  pancolonic Diverticulosis  [id number]  Polyps(2)  Biopsy/Snare      Complications: None          Devon Jeronimo MD     Date: 8/5/2020  Time: 13:49

## 2020-08-05 NOTE — BRIEF OP NOTE
Requesting sleep study for patient who had apparent apnea, reactive airway, decreased O2 Sats throughout EGD/Colonoscopy procedure.  Discussed with patient. She is agreeable to consult with the Sleep Lab.  Sleep lab notified and will see patient prior to discharge..

## 2020-08-07 LAB
CYTO UR: NORMAL
LAB AP CASE REPORT: NORMAL
LAB AP DIAGNOSIS COMMENT: NORMAL
PATH REPORT.FINAL DX SPEC: NORMAL
PATH REPORT.GROSS SPEC: NORMAL

## 2020-08-13 ENCOUNTER — OFFICE VISIT (OUTPATIENT)
Dept: SLEEP MEDICINE | Facility: HOSPITAL | Age: 70
End: 2020-08-13

## 2020-08-13 VITALS
WEIGHT: 216 LBS | SYSTOLIC BLOOD PRESSURE: 137 MMHG | HEART RATE: 73 BPM | BODY MASS INDEX: 33.9 KG/M2 | HEIGHT: 67 IN | DIASTOLIC BLOOD PRESSURE: 80 MMHG

## 2020-08-13 DIAGNOSIS — E66.9 OBESITY (BMI 30-39.9): ICD-10-CM

## 2020-08-13 DIAGNOSIS — G47.10 HYPERSOMNIA: ICD-10-CM

## 2020-08-13 DIAGNOSIS — G47.8 NON-RESTORATIVE SLEEP: ICD-10-CM

## 2020-08-13 DIAGNOSIS — G47.30 SLEEP APNEA, UNSPECIFIED TYPE: Primary | ICD-10-CM

## 2020-08-13 PROCEDURE — G0463 HOSPITAL OUTPT CLINIC VISIT: HCPCS

## 2020-08-13 PROCEDURE — 99213 OFFICE O/P EST LOW 20 MIN: CPT | Performed by: FAMILY MEDICINE

## 2020-08-13 NOTE — PROGRESS NOTES
[facility] New Patient/Consultation       Reason for Consultation: Witnessed apneas      Patient Care Team:  Evin Major MD as PCP - General (Family Medicine)  Shanice Landis APRN as Nurse Practitioner (Oncology)  Rey Stack MD as Consulting Physician (Sleep Medicine)      History of present illness:  Thank you for asking me to see your patient.  The patient is a 69 y.o. female presents today with concern for sleep disorder due to witnessed apneas hypersomnia nonrestorative sleep.  No history of prior sleep study.  Tonsillectomy in 1959.    Sleep Schedule:  Bed time: 11:15 PM  Sleep latency: Not very long  Wake time: 8:30 AM  Average hours slept: 8 to 9 hours  Non-restorative sleep: Sometimes  Number of naps per day: 0-1  Rotating shifts?:  No  Nocturia: 1X  Electronics in bedroom: Y    Excessive daytime sleepiness or drowsiness:Y  Any accidents at work due to sleepiness in the last 5 years:N  Any difficulty driving due to sleepiness or being drowsy: N  Weight changed in the last 5 years:N    Snoring:Y  Witnessed apneas:Y  Have you ever awakened gasping for breath, coughing, choking or respiratory discomfort: Y  Morning headaches: N  Awaken with a sore throat or dry mouth: N    Any reports of leg jerking at night: N  Urge sensations: N  Does pain disrupt sleep: N  Sweating during sleep: N  Teeth grinding: N    Any sudden episodes of sleep during the day: N  Sleep paralysis/hallucinations: N  Muscle weakness with laughing/anger: N  Nightmares: N  Sleep walking: N    Are you sleepy when you increase your sleep time: N  Do you sleep better away from your own bed: N    ESS: 4    Social History: Retired; smoking from ages [age]; no alcohol no drug use; 4-5 caffeinated beverages a day    Review of Systems:    A complete review of systems was done and all were negative with the exception of joint pain swollen joints frequent urination anxiety excessive thirst    Allergies:  Patient has no  "known allergies.    Family History: CARLITOS Y       Current Outpatient Medications:   •  hyoscyamine (ANASPAZ,LEVSIN) 0.125 MG tablet, , Disp: , Rfl:   •  indapamide (LOZOL) 1.25 MG tablet, Take 1.25 mg by mouth Every Morning., Disp: , Rfl:   •  losartan (COZAAR) 100 MG tablet, Take 100 mg by mouth Daily., Disp: , Rfl:   •  meloxicam (MOBIC) 15 MG tablet, Take 15 mg by mouth Daily., Disp: , Rfl:   •  metoprolol succinate XL (TOPROL-XL) 50 MG 24 hr tablet, Take 50 mg by mouth Daily., Disp: , Rfl:   •  OLANZapine (zyPREXA) 2.5 MG tablet, , Disp: , Rfl:   •  pantoprazole (PROTONIX) 40 MG EC tablet, Take 1 tablet by mouth Daily., Disp: 90 tablet, Rfl: 3  •  sucralfate (CARAFATE) 1 g tablet, Take 1 tablet by mouth 4 (Four) Times a Day., Disp: 120 tablet, Rfl: 11  •  sulfamethoxazole-trimethoprim (BACTRIM DS,SEPTRA DS) 800-160 MG per tablet, Take 1 tablet by mouth 2 (Two) Times a Day for 10 days., Disp: 20 tablet, Rfl: 1    Vital Signs:    Vitals:    08/13/20 1100   BP: 137/80   Pulse: 73   Weight: 98 kg (216 lb)   Height: 170.2 cm (67\")      Body mass index is 33.83 kg/m².  Neck Circumference: 14.75 inches      Physical Exam:   General Alert and oriented. No acute distress noted   Pharynx/Throat Class II/III Mallampati airway, large tongue, no evidence of redundant lateral pharyngeal tissue. No oral lesions. No thrush. Moist mucous membranes.   Head Normocephalic. Symmetrical. Atraumatic.    Nose No septal deviation. No drainage   Chest Wall Normal shape. Symmetric expansion with respiration. No tenderness.   Neck Trachea midline, no thyromegaly or adenopathy    Lungs Clear to auscultation bilaterally. No wheezes. No rhonchi. No rales. Respirations regular, even and unlabored.   Heart Regular rhythm and normal rate. Normal S1 and S2. No murmur   Abdomen Soft, non-tender and non-distended. Normal bowel sounds. No masses.   Extremities Moves all extremities well. No edema   Psychiatric Normal mood and affect. "       Impression:  1. Sleep apnea, unspecified type    2. Non-restorative sleep    3. Hypersomnia    4. Obesity (BMI 30-39.9)        Plan:    Good sleep hygiene measures should be maintained.  Weight loss would be beneficial in this patient who is obese with BMI 33.3.    I discussed the pathophysiology of obstructive sleep apnea with the patient.  We discussed the adverse outcomes associated with untreated sleep-disordered breathing.  We discussed treatment modalities of obstructive sleep apnea including CPAP device as well as oral mandibular advancement device. Sleep study will be scheduled to establish definitive diagnosis of sleep disorder breathing.  Weight loss will be strongly beneficial in order to reduce the severity of sleep-disordered breathing.  Patient has narrow oropharyngeal structure.  Caution during activities that require prolonged concentration is strongly advised.  Patient will be notified of sleep study results after sleep study is completed.  If sleep apnea is only mild,  oral mandibular advancement device may be one of the treatment options.  However if sleep apnea is moderately severe, CPAP treatment will be strongly encouraged.  The patient is not opposed to treatment with CPAP device if we confirm significant obstructive sleep apnea on polysomnography.     Thank you for allowing me to participate in your patient's care.    Rey Stack MD  Sleep Medicine  08/13/20  11:44

## 2020-08-31 ENCOUNTER — HOSPITAL ENCOUNTER (OUTPATIENT)
Dept: SLEEP MEDICINE | Facility: HOSPITAL | Age: 70
Discharge: HOME | End: 2020-08-31
Admitting: FAMILY MEDICINE

## 2020-08-31 DIAGNOSIS — G47.30 SLEEP APNEA, UNSPECIFIED TYPE: ICD-10-CM

## 2020-08-31 DIAGNOSIS — G47.10 HYPERSOMNIA: ICD-10-CM

## 2020-08-31 DIAGNOSIS — E66.9 OBESITY (BMI 30-39.9): ICD-10-CM

## 2020-08-31 DIAGNOSIS — G47.8 NON-RESTORATIVE SLEEP: ICD-10-CM

## 2020-08-31 PROCEDURE — 95806 SLEEP STUDY UNATT&RESP EFFT: CPT

## 2020-08-31 PROCEDURE — 95806 SLEEP STUDY UNATT&RESP EFFT: CPT | Performed by: FAMILY MEDICINE

## 2020-09-10 ENCOUNTER — TELEPHONE (OUTPATIENT)
Dept: SLEEP MEDICINE | Facility: HOSPITAL | Age: 70
End: 2020-09-10

## 2020-09-10 NOTE — TELEPHONE ENCOUNTER
Spoke to patient about results and explained to Patient she was very severe and her O2 was very low. Patient stated she needed time to think about things and she would call back to schedule a f/u w/Dr Scott

## 2020-10-01 ENCOUNTER — OFFICE VISIT (OUTPATIENT)
Dept: SLEEP MEDICINE | Facility: HOSPITAL | Age: 70
End: 2020-10-01

## 2020-10-01 VITALS
DIASTOLIC BLOOD PRESSURE: 91 MMHG | HEIGHT: 67 IN | WEIGHT: 220 LBS | HEART RATE: 82 BPM | SYSTOLIC BLOOD PRESSURE: 149 MMHG | BODY MASS INDEX: 34.53 KG/M2

## 2020-10-01 DIAGNOSIS — G47.33 SEVERE OBSTRUCTIVE SLEEP APNEA: Primary | ICD-10-CM

## 2020-10-01 PROCEDURE — G0463 HOSPITAL OUTPT CLINIC VISIT: HCPCS

## 2020-10-01 PROCEDURE — 99213 OFFICE O/P EST LOW 20 MIN: CPT | Performed by: FAMILY MEDICINE

## 2020-10-01 NOTE — PROGRESS NOTES
Follow Up Sleep Disorders Center Note     Chief Complaint:  CARLITOS     Primary Care Physician: Evin Major MD    Gisella Alaniz is a 69 y.o.female  was last seen at [facility] sleep lab: 2020 for home sleep study which showed overall AHIOf 59 events per hour.  Lowest oxygen saturation 49%.  Patient was advised to start auto CPAP 8-20 cm H2O with plans for nocturnal oximetry 2 weeks later.  Patient was told for results she said that this was very overwhelming and she had to think about it before starting treatment.  Presents today to discuss results.    Current Medications:    Current Outpatient Medications:   •  hyoscyamine (ANASPAZ,LEVSIN) 0.125 MG tablet, , Disp: , Rfl:   •  indapamide (LOZOL) 1.25 MG tablet, Take 1.25 mg by mouth Every Morning., Disp: , Rfl:   •  losartan (COZAAR) 100 MG tablet, Take 100 mg by mouth Daily., Disp: , Rfl:   •  meloxicam (MOBIC) 15 MG tablet, Take 15 mg by mouth Daily., Disp: , Rfl:   •  metoprolol succinate XL (TOPROL-XL) 50 MG 24 hr tablet, Take 50 mg by mouth Daily., Disp: , Rfl:   •  OLANZapine (zyPREXA) 2.5 MG tablet, , Disp: , Rfl:   •  pantoprazole (PROTONIX) 40 MG EC tablet, Take 1 tablet by mouth Daily., Disp: 90 tablet, Rfl: 3  •  sucralfate (CARAFATE) 1 g tablet, Take 1 tablet by mouth 4 (Four) Times a Day., Disp: 120 tablet, Rfl: 11   also entered in Sleep Questionnaire    Patient  has a past medical history of Anxiety, Colon polyp, Hiatal hernia, History of ear infections, Hyperlipidemia, Hypertension, and Pneumonia.    Social History:    Social History     Socioeconomic History   • Marital status:      Spouse name: Not on file   • Number of children: Not on file   • Years of education: Not on file   • Highest education level: Not on file   Tobacco Use   • Smoking status: Former Smoker     Types: Cigarettes     Quit date: 3/16/2006     Years since quittin.5   • Smokeless tobacco: Never Used   • Tobacco comment: Former smoker quit 12 years ago.  Smoked 1.5  "ppd for 20 years and 1 ppd for 19 years for a 49 pack year history.   Substance and Sexual Activity   • Alcohol use: No   • Drug use: No   • Sexual activity: Yes       Allergies:  Patient has no known allergies.    Review of Systems:    A complete review of systems was done and all were negative with the exception of all negative    Vital Signs:    Vitals:    10/01/20 1100   BP: 149/91   Pulse: 82   Weight: 99.8 kg (220 lb)   Height: 170.2 cm (67\")     Body mass index is 34.46 kg/m².    Vital Signs /91   Pulse 82   Ht 170.2 cm (67\")   Wt 99.8 kg (220 lb)   BMI 34.46 kg/m²  Body mass index is 34.46 kg/m².    General Alert and oriented. No acute distress noted   Pharynx/Throat Class II/III Mallampati airway, large tongue, no evidence of redundant lateral pharyngeal tissue. No oral lesions. No thrush. Moist mucous membranes.   Head Normocephalic. Symmetrical. Atraumatic.    Nose No septal deviation. No drainage   Chest Wall Normal shape. Symmetric expansion with respiration. No tenderness.   Neck Trachea midline, no thyromegaly or adenopathy    Lungs Clear to auscultation bilaterally. No wheezes. No rhonchi. No rales. Respirations regular, even and unlabored.   Heart Regular rhythm and normal rate. Normal S1 and S2. No murmur   Abdomen Soft, non-tender and non-distended. Normal bowel sounds. No masses.   Extremities Moves all extremities well. No edema   Psychiatric Normal mood and affect.     Impression:  1. Severe obstructive sleep apnea        Amenable to trying auto CPAP.  8-20 cm H2O.  Return to clinic in 6 weeks for follow-up or sooner if needed.      Weight loss will be strongly beneficial to reduce the severity of sleep-disordered breathing.  Caution during activities that require prolonged concentration is strongly advised if sleepiness returns. Changing of PAP supplies regularly is important for effective use. Patient needs to change cushion on the mask or plugs on nasal pillows along with " disposable filters once every month and change mask frame, tubing, headgear and Velcro straps every 6 months at the minimum.    Rey Stack MD  Sleep Medicine  10/01/20  11:50 EDT

## 2020-11-19 ENCOUNTER — OFFICE VISIT (OUTPATIENT)
Dept: SLEEP MEDICINE | Facility: HOSPITAL | Age: 70
End: 2020-11-19

## 2020-11-19 DIAGNOSIS — G47.33 SEVERE OBSTRUCTIVE SLEEP APNEA: Primary | ICD-10-CM

## 2020-11-19 PROCEDURE — G0463 HOSPITAL OUTPT CLINIC VISIT: HCPCS

## 2020-11-19 PROCEDURE — 99213 OFFICE O/P EST LOW 20 MIN: CPT | Performed by: FAMILY MEDICINE

## 2020-11-19 NOTE — PROGRESS NOTES
Follow Up Sleep Disorders Center Note     Chief Complaint:  CARLITOS     Primary Care Physician: Evin Major MD    Gisella Alaniz is a 69 y.o.female  was last seen at [facility] sleep lab: 10/1/2020.  Home sleep study August 2020 showed overall AHI of 59 events per hour with lowest oxygen saturation of 49%.  At last visit patient presented to discuss test results before considering treatment.  She was very overwhelmed by test results and want to discuss CPAP before trying it.  By the end of the appointment she was amenable to trying auto CPAP 8-20 cm H2O.  Presents today for first follow-up visit.    Results Review:  DME is [facility].  Downloads between 10/20/2020-11/18/2020.  Average usage is 3 hours 35 minutes.  Average AHI is 3.9.  Average AutoCPAP pressure is 12.3 cm H2O.    Current Medications:    Current Outpatient Medications:   •  hyoscyamine (ANASPAZ,LEVSIN) 0.125 MG tablet, , Disp: , Rfl:   •  indapamide (LOZOL) 1.25 MG tablet, Take 1.25 mg by mouth Every Morning., Disp: , Rfl:   •  losartan (COZAAR) 100 MG tablet, Take 100 mg by mouth Daily., Disp: , Rfl:   •  meloxicam (MOBIC) 15 MG tablet, Take 15 mg by mouth Daily., Disp: , Rfl:   •  metoprolol succinate XL (TOPROL-XL) 50 MG 24 hr tablet, Take 50 mg by mouth Daily., Disp: , Rfl:   •  OLANZapine (zyPREXA) 2.5 MG tablet, , Disp: , Rfl:   •  pantoprazole (PROTONIX) 40 MG EC tablet, Take 1 tablet by mouth Daily., Disp: 90 tablet, Rfl: 3  •  sucralfate (CARAFATE) 1 g tablet, Take 1 tablet by mouth 4 (Four) Times a Day., Disp: 120 tablet, Rfl: 11   also entered in Sleep Questionnaire    Patient  has a past medical history of Anxiety, Colon polyp, Hiatal hernia, History of ear infections, Hyperlipidemia, Hypertension, and Pneumonia.    Social History:    Social History     Socioeconomic History   • Marital status:      Spouse name: Not on file   • Number of children: Not on file   • Years of education: Not on file   • Highest education level: Not on file    Tobacco Use   • Smoking status: Former Smoker     Types: Cigarettes     Quit date: 3/16/2006     Years since quittin.6   • Smokeless tobacco: Never Used   • Tobacco comment: Former smoker quit 12 years ago.  Smoked 1.5 ppd for 20 years and 1 ppd for 19 years for a 49 pack year history.   Substance and Sexual Activity   • Alcohol use: No   • Drug use: No   • Sexual activity: Yes       Allergies:  Patient has no known allergies.    Review of Systems:    A complete review of systems was done and all were negative with the exception of anxiety nasal congestion    Vital Signs:  There were no vitals filed for this visit.  There is no height or weight on file to calculate BMI.    Vital Signs There were no vitals taken for this visit. There is no height or weight on file to calculate BMI.    General Alert and oriented. No acute distress noted   Pharynx/Throat Class II/III Mallampati airway, large tongue, no evidence of redundant lateral pharyngeal tissue. No oral lesions. No thrush. Moist mucous membranes.   Head Normocephalic. Symmetrical. Atraumatic.    Nose No septal deviation. No drainage   Chest Wall Normal shape. Symmetric expansion with respiration. No tenderness.   Neck Trachea midline, no thyromegaly or adenopathy    Lungs Clear to auscultation bilaterally. No wheezes. No rhonchi. No rales. Respirations regular, even and unlabored.   Heart Regular rhythm and normal rate. Normal S1 and S2. No murmur   Abdomen Soft, non-tender and non-distended. Normal bowel sounds. No masses.   Extremities Moves all extremities well. No edema   Psychiatric Normal mood and affect.     Impression:  1. Severe obstructive sleep apnea        Obstructive sleep apnea not adequately treated with auto CPAP 8-20 cm H2O with poor compliance and usage and continued complaints of hypersomnolence.  Mask fitting today for better fit.  Change to set pressure 13 cm H2O she prefers feeling higher pressure as opposed to lower pressure.  Patient  will adjust ramp setting on her own at home however she feels more comfortable.  Return to clinic in 6 weeks for follow-up or sooner if needed.    Patient uses the CPAP device and benefits from its use in terms of reduction of hypersomnia and snoring.Weight loss will be strongly beneficial to reduce the severity of sleep-disordered breathing.  Caution during activities that require prolonged concentration is strongly advised if sleepiness returns. Changing of PAP supplies regularly is important for effective use. Patient needs to change cushion on the mask or plugs on nasal pillows along with disposable filters once every month and change mask frame, tubing, headgear and Velcro straps every 6 months at the minimum.    Rey Stack MD  Sleep Medicine  11/19/20  11:34 EST

## 2021-01-07 ENCOUNTER — APPOINTMENT (OUTPATIENT)
Dept: SLEEP MEDICINE | Facility: HOSPITAL | Age: 71
End: 2021-01-07

## 2021-01-19 ENCOUNTER — OFFICE VISIT (OUTPATIENT)
Dept: SLEEP MEDICINE | Facility: HOSPITAL | Age: 71
End: 2021-01-19

## 2021-01-19 VITALS
DIASTOLIC BLOOD PRESSURE: 85 MMHG | BODY MASS INDEX: 33.9 KG/M2 | HEART RATE: 73 BPM | HEIGHT: 67 IN | WEIGHT: 216 LBS | SYSTOLIC BLOOD PRESSURE: 140 MMHG

## 2021-01-19 DIAGNOSIS — G47.33 OBSTRUCTIVE SLEEP APNEA: Primary | ICD-10-CM

## 2021-01-19 DIAGNOSIS — E66.9 OBESITY (BMI 30-39.9): ICD-10-CM

## 2021-01-19 PROCEDURE — G0463 HOSPITAL OUTPT CLINIC VISIT: HCPCS

## 2021-01-19 NOTE — PROGRESS NOTES
[facility]  [address]  [address]  [place] [address]  [phone]    Referring Physician: Self  PCP: Evin Major MD    Reason for consult:  Sleep disorders of CARLITOS    Gisella Alaniz is a 70 y.o.female was seen in the Sleep Disorders Center today. Previously seeing Dr. Stack and switching care. She sleeps from 11:30pm to 7am. She uses hybrid ffm and finds it claustrophobic. She wakes up somewhat rested but besides a little more energy in afternoon, she does not notice much improvement. Her mouth gets very dry. She does not have much sinus congestion. She feels that she does not get enough air with the device.  She quit smoking 2006. She gets soa with exertion. She is not on inhalers. She gets pneumonia frequently.  Botkins Sleepiness Score: 3. Caffeine intake 5 per day. Alcohol intake 0 per week.    Gisella Alaniz  has a past medical history of Anxiety, Colon polyp, Hiatal hernia, History of ear infections, Hyperlipidemia, Hypertension, and Pneumonia.     Current Medications:    Current Outpatient Medications:   •  hyoscyamine (ANASPAZ,LEVSIN) 0.125 MG tablet, , Disp: , Rfl:   •  indapamide (LOZOL) 1.25 MG tablet, Take 1.25 mg by mouth Every Morning., Disp: , Rfl:   •  losartan (COZAAR) 100 MG tablet, Take 100 mg by mouth Daily., Disp: , Rfl:   •  meloxicam (MOBIC) 15 MG tablet, Take 15 mg by mouth Daily., Disp: , Rfl:   •  metoprolol succinate XL (TOPROL-XL) 50 MG 24 hr tablet, Take 50 mg by mouth Daily., Disp: , Rfl:   •  OLANZapine (zyPREXA) 2.5 MG tablet, , Disp: , Rfl:   •  pantoprazole (PROTONIX) 40 MG EC tablet, Take 1 tablet by mouth Daily., Disp: 90 tablet, Rfl: 3  •  sucralfate (CARAFATE) 1 g tablet, Take 1 tablet by mouth 4 (Four) Times a Day., Disp: 120 tablet, Rfl: 11   also listed in Sleep Questionnaire.    FH: family history is not on file.  SH:  reports that she quit smoking about 14 years ago. Her smoking use included cigarettes. She has never used  "smokeless tobacco. She reports that she does not drink alcohol or use drugs.    Pertinent Positive Review of Systems of denies  Rest of Review of Systems was negative as recorded in Sleep Questionnaire.        Vital Signs: /85   Pulse 73   Ht 170.2 cm (67\")   Wt 98 kg (216 lb)   BMI 33.83 kg/m²     Body mass index is 33.83 kg/m².       Tongue: Normal       Dentition: good       Pharynx: Posterior pharyngeal pillars are wide   Mallampatti: III (soft and hard palate and base of uvula visible)        General: Alert. Cooperative. Well developed. No acute distress.             Head:  Normocephalic. Symmetrical. Atraumatic.              Eyes: Sclera clear. No icterus. PERRLA. Normal EOM.             Ears: No deformities. Normal hearing.             Nose: No septal deviation. No drainage.          Throat: No oral lesions. No thrush. Moist mucous membranes.    Chest Wall:  Normal shape. Symmetric expansion with respiration. No tenderness.             Neck:  Trachea midline.           Lungs:  Clear to auscultation bilaterally. No wheezes. No rhonchi. No rales. Respirations regular, even and unlabored.            Heart:  Regular rhythm and normal rate. Normal S1 and S2. No murmur.     Abdomen:  Soft, non-tender and non-distended. Normal bowel sounds. No masses.  Extremities:  Moves all extremities well. No edema.           Pulses: Pulses palpable and equal bilaterally.               Skin: Dry. Intact. No bleeding. No rash.           Neuro: Moves all 4 extremities and cranial nerves grossly intact.  Psychiatric: Normal mood and affect.    Study:  · The patient had 158 obstructive events and 367 partial obstructive events. AHI for total sleep time is 59.    Testing:  · 89% compliance average usage 5 hours 19 minutes AHI 3.2 set pressure in 13 cm    [facility]    Impression:  1. Obstructive sleep apnea    2. Obesity (BMI 30-39.9)        Plan:  Gisella is compliant but feels that she does not get enough air pressure " on the machine.  She is still as tired as prior to using CPAP.  She would like a higher pressure and I will therefore try fixed 15.  I did caution her that this will worsen dry mouth.  She can try chin strap and this was ordered through [employer].    She likely has COPD from her smoking history. No exacerbation at this time. I will check KANNAN on CPAP.  I offered her an appointment for pulmonary evaluation but she declines at this time.    I reiterated the importance of effective treatment of obstructive sleep apnea with PAP machine.  Cardiovascular health risks of untreated sleep apnea were again reviewed.  Patient was asked to remain cautious if there is persistent hypersomnolence.    Change of PAP supplies regularly is important for effective use.  Change of cushion on the mask or plugs on nasal pillows along with disposable filters once every month and change of mask frame, tubing, headgear and Velcro straps every 6 months at the minimum was reiterated.    This patient is compliant with PAP machine and benefits from its use.  Apnea hypopneas index is corrected/improved.  Daytime hypersomnolence has resolved.    Weight reduction to achieve an ideal BMI will decrease the severity of obstructive sleep apnea.  Portion limitation and regular exercise was emphasized.    Patient will follow up in this clinic 1 year.    Thank you for allowing me to participate in your patient's care.    Electronically signed by Orlando Reyes MD, 01/19/21, 11:35 AM EST.    Part of this note may be an electronic transcription/translation of spoken language to printed text using the Dragon Dictation System.

## 2021-01-20 ENCOUNTER — DOCUMENTATION (OUTPATIENT)
Dept: SLEEP MEDICINE | Facility: HOSPITAL | Age: 71
End: 2021-01-20

## 2021-01-20 NOTE — PROGRESS NOTES
Patient called in today stating that the pressure change that we made yesterday is too high, put a message in epic to Dr Reyes and he approved of returning patient pressure back to 13.0, this was done and let patient know-AK

## 2021-04-13 ENCOUNTER — OFFICE VISIT (OUTPATIENT)
Dept: SLEEP MEDICINE | Facility: HOSPITAL | Age: 71
End: 2021-04-13

## 2021-04-13 VITALS
BODY MASS INDEX: 33.59 KG/M2 | WEIGHT: 214 LBS | SYSTOLIC BLOOD PRESSURE: 141 MMHG | DIASTOLIC BLOOD PRESSURE: 85 MMHG | HEIGHT: 67 IN | HEART RATE: 80 BPM

## 2021-04-13 DIAGNOSIS — E66.9 OBESITY (BMI 30-39.9): ICD-10-CM

## 2021-04-13 DIAGNOSIS — G47.33 OBSTRUCTIVE SLEEP APNEA: Primary | ICD-10-CM

## 2021-04-13 PROCEDURE — G0463 HOSPITAL OUTPT CLINIC VISIT: HCPCS

## 2021-04-13 NOTE — PROGRESS NOTES
"[facility]  [address]  [address]  [facility] [address]  [phone]    PCP: Evin Major MD    Reason for visit:  Sleep disorders: CARLITOS    Gisella is a 70 y.o.female who was seen in the Sleep Disorders Center today. She has dreamwear ffm but does not like it. She continues to have excessively dry mouth and plans to try a different mask fit. We tried higher CPAP of 15 cms but she did not tolerate and is back down to 13 cms as before. She sleeps from 11:30pm to 8am. She feels rested when she awakens and denies EDS. Some afternoons she gets sleepy.  Eitzen Sleepiness Scale is dnc. Caffeine 4 per day. Alcohol 0 per week.    Gisella  reports that she quit smoking about 15 years ago. Her smoking use included cigarettes. She has never used smokeless tobacco.    Pertinent Positive Review of Systems of anxiety  Rest of Review of Systems was negative as recorded in Sleep Questionnaire.    Patient  has a past medical history of Anxiety, Colon polyp, Hiatal hernia, History of ear infections, Hyperlipidemia, Hypertension, and Pneumonia.     Current Medications:    Current Outpatient Medications:   •  hyoscyamine (ANASPAZ,LEVSIN) 0.125 MG tablet, , Disp: , Rfl:   •  indapamide (LOZOL) 1.25 MG tablet, Take 1.25 mg by mouth Every Morning., Disp: , Rfl:   •  losartan (COZAAR) 100 MG tablet, Take 100 mg by mouth Daily., Disp: , Rfl:   •  meloxicam (MOBIC) 15 MG tablet, Take 15 mg by mouth Daily., Disp: , Rfl:   •  metoprolol succinate XL (TOPROL-XL) 50 MG 24 hr tablet, Take 50 mg by mouth Daily., Disp: , Rfl:   •  OLANZapine (zyPREXA) 2.5 MG tablet, , Disp: , Rfl:   •  pantoprazole (PROTONIX) 40 MG EC tablet, Take 1 tablet by mouth Daily., Disp: 90 tablet, Rfl: 3  •  sucralfate (CARAFATE) 1 g tablet, Take 1 tablet by mouth 4 (Four) Times a Day., Disp: 120 tablet, Rfl: 11   also entered in Sleep Questionnaire         Vital Signs: /85   Pulse 80   Ht 170.2 cm (67\")   Wt 97.1 kg (214 lb)  "  BMI 33.52 kg/m²     Body mass index is 33.52 kg/m².       Tongue: Large       Dentition: good       Pharynx: Posterior pharyngeal pillars are narrow   Mallampatti: II (hard and soft palate, upper portion of tonsils anduvula visible)        General: Alert. Cooperative. Well developed. No acute distress.             Head:  Normocephalic. Symmetrical. Atraumatic.              Nose: No septal deviation. No drainage.          Throat: No oral lesions. No thrush. Moist mucous membranes.    Chest Wall:  Normal shape. Symmetric expansion with respiration. No tenderness.             Neck:  Trachea midline.           Lungs:  Clear to auscultation bilaterally. No wheezes. No rhonchi. No rales. Respirations regular, even and unlabored.            Heart:  Regular rhythm and normal rate. Normal S1 and S2. No murmur.     Abdomen:  Soft, non-tender and non-distended. Normal bowel sounds. No masses.  Extremities:  Moves all extremities well. No edema.    Psychiatric: Normal mood and affect.    Diagnostic data available to date is as below and was reviewed on current visit:  · 9/4/20  The patient had 158 obstructive events and 367 partial obstructive events. AHI for total sleep time is 59.    Most current available usage data reviewed on 04/13/2021:  · Compliance for last 90 days 64% average usage 4 hours 59 minutes AHI 2.3 set pressure 13 cm    DME Company: [employer]    Impression:  1. Obstructive sleep apnea    2. Obesity (BMI 30-39.9)        Plan:  Gisella has persistent dry mouth.  Her AHI is less than 5.  She did not tolerate pressures going up to 15 cm.  I offered lowered pr, she does not want due sensation of claustrophobia sometimes.  She plans to get a new mask within the next 2 weeks.  If still air leaks and dry mouth, then she will need a titration study.  She agrees with this plan.    I reiterated the importance of effective treatment of obstructive sleep apnea with PAP machine.  Cardiovascular health risks of untreated  sleep apnea were again reviewed.  Patient was asked to remain cautious if there is persistent hypersomnolence. The benefit of weight loss in reducing severity of obstructive sleep apnea was discussed.  Patient would benefit from adhering to a strict diet to achieve ideal BMI.     Change of PAP supplies regularly is important for effective use.  Change of cushion on the mask or plugs on nasal pillows along with disposable filters once every month and change of mask frame, tubing, headgear and Velcro straps every 6 months at the minimum was reiterated.    This patient is compliant with PAP machine and benefits from its use.  Apnea hypopneas index is corrected/improved.  Daytime hypersomnolence has resolved.     Patient will follow up in this clinic in 4 months.    Thank you for allowing me to participate in your patient's care.    Electronically signed by Orlando Reyes MD, 04/13/21, 11:16 AM EDT.    Part of this note may be an electronic transcription/translation of spoken language to printed text using the Dragon Dictation System.

## 2021-08-17 ENCOUNTER — OFFICE VISIT (OUTPATIENT)
Dept: SLEEP MEDICINE | Facility: HOSPITAL | Age: 71
End: 2021-08-17

## 2021-08-17 VITALS
SYSTOLIC BLOOD PRESSURE: 128 MMHG | BODY MASS INDEX: 33.9 KG/M2 | WEIGHT: 216 LBS | DIASTOLIC BLOOD PRESSURE: 81 MMHG | HEART RATE: 79 BPM | HEIGHT: 67 IN

## 2021-08-17 DIAGNOSIS — E66.01 OBESITY, MORBID, BMI 40.0-49.9 (HCC): ICD-10-CM

## 2021-08-17 DIAGNOSIS — G47.33 OBSTRUCTIVE SLEEP APNEA: Primary | ICD-10-CM

## 2021-08-17 DIAGNOSIS — R68.2 DRY MOUTH: ICD-10-CM

## 2021-08-17 PROCEDURE — G0463 HOSPITAL OUTPT CLINIC VISIT: HCPCS

## 2021-08-17 NOTE — PROGRESS NOTES
"[facility] SLEEP MEDICINE  [address]  3RD FLOOR  [facility] [address]  [phone]    PCP: Evin Major MD    Reason for visit:  Sleep disorders: CARLITOS    Gisella is a 70 y.o.female who was seen in the Sleep Disorders Center today. She is here for review. Using [employer]wear ffm and wants nasal only. She feels the mask fit is better now that it is tighter. She sleeps from 11:30pm to 8am and wakes up feeling rested. No EDS. Pressure feels okay. Still with dry mouth.  [place] Sleepiness Scale is 6. Caffeine 4-5 per day. Alcohol 0 per week.    Gisella  reports that she quit smoking about 15 years ago. Her smoking use included cigarettes. She has never used smokeless tobacco.    Pertinent Positive Review of Systems of dry mouth, PND  Rest of Review of Systems was negative as recorded in Sleep Questionnaire.    Patient  has a past medical history of Anxiety, Colon polyp, Hiatal hernia, History of ear infections, Hyperlipidemia, Hypertension, and Pneumonia.     Current Medications:    Current Outpatient Medications:   •  hyoscyamine (ANASPAZ,LEVSIN) 0.125 MG tablet, , Disp: , Rfl:   •  indapamide (LOZOL) 1.25 MG tablet, Take 1.25 mg by mouth Every Morning., Disp: , Rfl:   •  losartan (COZAAR) 100 MG tablet, Take 100 mg by mouth Daily., Disp: , Rfl:   •  meloxicam (MOBIC) 15 MG tablet, Take 15 mg by mouth Daily., Disp: , Rfl:   •  metoprolol succinate XL (TOPROL-XL) 50 MG 24 hr tablet, Take 50 mg by mouth Daily., Disp: , Rfl:   •  OLANZapine (zyPREXA) 2.5 MG tablet, , Disp: , Rfl:   •  pantoprazole (PROTONIX) 40 MG EC tablet, Take 1 tablet by mouth Daily., Disp: 90 tablet, Rfl: 3  •  sucralfate (CARAFATE) 1 g tablet, Take 1 tablet by mouth 4 (Four) Times a Day., Disp: 120 tablet, Rfl: 11   also entered in Sleep Questionnaire         Vital Signs: /81   Pulse 79   Ht 170.2 cm (67\")   Wt 98 kg (216 lb)   BMI 33.83 kg/m²     Body mass index is 33.83 kg/m².       Tongue: Large       " Dentition: good       Pharynx: Posterior pharyngeal pillars are wide   Mallampatti: III (soft and hard palate and base of uvula visible)        General: Alert. Cooperative. Well developed. No acute distress.             Head:  Normocephalic. Symmetrical. Atraumatic.              Nose: No septal deviation. No drainage.          Throat: No oral lesions. No thrush. Moist mucous membranes.    Chest Wall:  Normal shape. Symmetric expansion with respiration. No tenderness.             Neck:  Trachea midline.           Lungs:  Clear to auscultation bilaterally. No wheezes. No rhonchi. No rales. Respirations regular, even and unlabored.            Heart:  Regular rhythm and normal rate. Normal S1 and S2. No murmur.     Abdomen:  Soft, non-tender and non-distended. Normal bowel sounds. No masses.  Extremities:  Moves all extremities well. No edema.    Psychiatric: Normal mood and affect.    Diagnostic data available to date is as below and was reviewed on current visit:  · 9/4/20  The patient had 158 obstructive events and 367 partial obstructive events. AHI for total sleep time is 59.    Most current available usage data reviewed on 08/17/2021:  · 92% compliance average 5-1/2 hours AHI 2.5 set pressure 13 cm    DME Company: [employer]    No orders of the defined types were placed in this encounter.         Prescription to [facility] [place] for replacement supplies as below:    full face mask      Description Replacement    Nasal PILLOWS      A 7034 Nasal Pillows  every 3 mth    A 7033 Repl Nasal Pillows  2 per mth    Nasal MASK/CUSHION      A 7034 Nasal Mask/Cushion  every 3 mth    A 7032 Repl Nasal Mask/Cushion  2 per mth    Full Face MASK     x A 7030 Full Face Mask  every 3 mth   x A 7031 Repl Face Mask  1 per mth      A 4604 Heated Tubing  every 3 mth    A 7037 Standard Tubing  every 3 mth   x A 7035 Headgear  every 3 mth   x A 7046 Repl Humidifier Chamber  every 6 yrs   x A 7038 Disposable Filters  2 per mth   x A 7039 Non-disposable  Filter  every 6 mth   x A 7036 Chin Strap  every 6 mth           Impression:  1. Obstructive sleep apnea    2. Obesity, morbid, BMI 40.0-49.9 (CMS/HCC)    3. Dry mouth        Plan:  Gisella still with considerable air leak and likely coming from her mouth opening with CPAP pressures.  I asked her to try nasal pillows or cushion with chin strap.  She otherwise feels more rested and refreshed.  She was advised to continue use of CPAP machine regularly.  If persistent problems with mask fit she can give us a call and we can address further.    I reiterated the importance of effective treatment of obstructive sleep apnea with PAP machine.  Cardiovascular health risks of untreated sleep apnea were again reviewed.  Patient was asked to remain cautious if there is persistent hypersomnolence. The benefit of weight loss in reducing severity of obstructive sleep apnea was discussed.  Patient would benefit from adhering to a strict diet to achieve ideal BMI.     Change of PAP supplies regularly is important for effective use.  Change of cushion on the mask or plugs on nasal pillows along with disposable filters once every month and change of mask frame, tubing, headgear and Velcro straps every 6 months at the minimum was reiterated.    This patient is compliant with PAP machine and benefits from its use.  Apnea hypopneas index is corrected/improved.  Daytime hypersomnolence has resolved.     Patient will follow up in this clinic in 1 year APRN    Thank you for allowing me to participate in your patient's care.    Electronically signed by Orlando Reyes MD, 08/17/21, 11:07 AM EDT.    Part of this note may be an electronic transcription/translation of spoken language to printed text using the Dragon Dictation System.

## 2021-10-11 ENCOUNTER — OFFICE VISIT (OUTPATIENT)
Dept: GASTROENTEROLOGY | Facility: CLINIC | Age: 71
End: 2021-10-11

## 2021-10-11 VITALS
WEIGHT: 216.8 LBS | BODY MASS INDEX: 34.03 KG/M2 | HEIGHT: 67 IN | DIASTOLIC BLOOD PRESSURE: 80 MMHG | SYSTOLIC BLOOD PRESSURE: 138 MMHG

## 2021-10-11 DIAGNOSIS — K44.9 HIATAL HERNIA: ICD-10-CM

## 2021-10-11 DIAGNOSIS — K62.5 RECTAL BLEEDING: ICD-10-CM

## 2021-10-11 DIAGNOSIS — K22.2 ESOPHAGEAL STRICTURE: ICD-10-CM

## 2021-10-11 DIAGNOSIS — Z86.010 PERSONAL HISTORY OF COLONIC POLYPS: Primary | ICD-10-CM

## 2021-10-11 DIAGNOSIS — K25.7: ICD-10-CM

## 2021-10-11 PROCEDURE — 99213 OFFICE O/P EST LOW 20 MIN: CPT | Performed by: INTERNAL MEDICINE

## 2021-10-11 RX ORDER — OLANZAPINE 2.5 MG/1
TABLET ORAL
COMMUNITY

## 2021-10-11 RX ORDER — PANTOPRAZOLE SODIUM 40 MG/1
40 TABLET, DELAYED RELEASE ORAL DAILY
Qty: 90 TABLET | Refills: 3 | Status: SHIPPED | OUTPATIENT
Start: 2021-10-11

## 2021-10-11 RX ORDER — CHLORCYCLIZINE HYDROCHLORIDE AND PSEUDOEPHEDRINE HYDROCHLORIDE 25; 60 MG/1; MG/1
TABLET ORAL EVERY 8 HOURS SCHEDULED
COMMUNITY

## 2021-10-11 NOTE — PROGRESS NOTES
PATIENT INFORMATION  Gisella Alaniz       - 1950    CHIEF COMPLAINT  Chief Complaint   Patient presents with   • Rectal Bleeding       HISTORY OF PRESENT ILLNESS  Here for rectal bleeding but only a year out from her colon with DIverticulitis but her abd pain now is intermittent and mid line  No vomitnig and what ever blood and mucous she saw lat week is gone and had run thru abx last month for sinus infection    Reviewewed hr colon and EGD from last year and she is doing fine from a Dyspepsia satndpoint but ha stoppe both sucralfate and PPI!!    This was discurageda dn would prioritize her PPI over her CarafATE    Was screened for CARLITOS at time of her EGD/COlon and is now on CPAP nightly        REVIEWED PERTINENT RESULTS/ LABS  Lab Results   Component Value Date    CASEREPORT  2020     Surgical Pathology Report                         Case: [id number]                                  Authorizing Provider:  Devon Jeronimo        Collected:           2020 01:24 PM                                 MD Cody                                                                   Ordering Location:     [facility]   Received:            2020 02:04 PM                                 OR                                                                           Pathologist:           Len Bradford MD                                                         Specimens:   1) - Small Intestine, Duodenum, BX                                                                  2) - Stomach, BX                                                                                    3) - Esophagus, Distal, BX                                                                          4) - Large Intestine, Right / Ascending Colon                                                       5) - Large Intestine, Sigmoid Colon                                                        FINALDX  2020     1.  Duodenum Biopsy:    A. Benign duodenal mucosa with relatively normal villous architecture.   B. No active inflammation, granulomatous inflammation nor intestinal parasites identified by routine staining.     2. Stomach Biopsy: Benign gastric mucosa with    A. Mild chronic inflammation with mucosal alteration noted suggesting chemical gastropathy (bile reflux, NSAIDs).   B. No H. pylori-like organisms identified by routine staining.   C. Focal atypical repair with rare intestinal metaplasia noted, no dysplasia nor malignancy identified.    3. Distal Esophagus Biopsy: Benign predominantly squamous mucosa and scant glandular mucosa with   A. Mild chronic inflammation with rare eosinophil noted suggesting chronic reflux (see comment).   B. No intestinal metaplasia identified by routine staining.   C. No dysplasia nor malignancy identified.    4. Right Ascending Colon Biopsy:    A. Polypoid colonic mucosa with features of developing tubular adenoma.   B. No high grade glandular dysplasia nor malignancy identified.    5. Sigmoid Colon Biopsy:    A. Polypoid colonic mucosa with features of hyperplastic polyp with prominent lymphoid aggregates.   B. No active inflammation nor granulomatous inflammation identified.   C. No glandular dysplasia nor malignancy identified.    jat/genae           Lab Results   Component Value Date    HGB 15.0 04/05/2017    MCV 88.1 04/05/2017     04/05/2017    ALT 17 04/05/2017    AST 19 04/05/2017      No results found.    REVIEW OF SYSTEMS  Review of Systems   Constitutional: Negative for activity change, chills, fever and unexpected weight change.   HENT: Negative for congestion.    Eyes: Negative for visual disturbance.   Respiratory: Negative for shortness of breath.    Cardiovascular: Negative for chest pain and palpitations.   Gastrointestinal: Positive for abdominal distention, abdominal pain, anal bleeding, blood in stool and diarrhea.   Endocrine: Negative for cold intolerance and  heat intolerance.   Genitourinary: Negative for hematuria.   Musculoskeletal: Negative for gait problem.   Skin: Negative for color change.   Allergic/Immunologic: Negative for immunocompromised state.   Neurological: Negative for weakness and light-headedness.   Hematological: Negative for adenopathy.   Psychiatric/Behavioral: Negative for sleep disturbance. The patient is not nervous/anxious.          ACTIVE PROBLEMS  Patient Active Problem List    Diagnosis    • Severe obstructive sleep apnea [G47.33]    • Epigastric pain [R10.13]    • Personal history of colonic polyps [Z86.010]    • Hiatal hernia [K44.9]          PAST MEDICAL HISTORY  Past Medical History:   Diagnosis Date   • Anxiety    • Colon polyp    • Hiatal hernia    • History of ear infections    • Hyperlipidemia    • Hypertension    • Pneumonia          SURGICAL HISTORY  Past Surgical History:   Procedure Laterality Date   • CHOLECYSTECTOMY     • COLONOSCOPY     • COLONOSCOPY N/A 8/5/2020    Procedure: COLONOSCOPY; POLYPECTOMY;  Surgeon: Devon Jeronimo MD;  Location: [facility] OR;  Service: Gastroenterology;  Laterality: N/A;  POLYPS  DIVERTICULITIS   • ENDOSCOPY N/A 8/5/2020    Procedure: ESOPHAGOGASTRODUODENOSCOPY WITH BIOPSIES AND DILATION;  Surgeon: Devon Jeronimo MD;  Location: [facility] OR;  Service: Gastroenterology;  Laterality: N/A;  HIATAL HERNIA  DUODENITIS  GASTRITIS  ESOPHAGEAL STRICTURE  ESOPHAGITIS  CAMERONS LESIONS   • KNEE ARTHROSCOPY     • TONSILLECTOMY     • TUBAL ABDOMINAL LIGATION           FAMILY HISTORY  Family History   Problem Relation Age of Onset   • Colon cancer Neg Hx    • Colon polyps Neg Hx    • Esophageal cancer Neg Hx    • Liver cancer Neg Hx    • Rectal cancer Neg Hx    • Stomach cancer Neg Hx          SOCIAL HISTORY  Social History     Occupational History   • Not on file   Tobacco Use   • Smoking status: Former Smoker     Types: Cigarettes     Quit date: 3/16/2006     Years since quitting: 15.5   •  "Smokeless tobacco: Never Used   • Tobacco comment: Former smoker quit 12 years ago.  Smoked 1.5 ppd for 20 years and 1 ppd for 19 years for a 49 pack year history.   Vaping Use   • Vaping Use: Never used   Substance and Sexual Activity   • Alcohol use: No   • Drug use: No   • Sexual activity: Yes         CURRENT MEDICATIONS    Current Outpatient Medications:   •  Chlorcyclizine-Pseudoephed (Stahist AD) 25-60 MG tablet, Every 8 (Eight) Hours., Disp: , Rfl:   •  indapamide (LOZOL) 1.25 MG tablet, Take 1.25 mg by mouth Every Morning., Disp: , Rfl:   •  losartan (COZAAR) 100 MG tablet, Take 100 mg by mouth Daily., Disp: , Rfl:   •  meloxicam (MOBIC) 15 MG tablet, Take 15 mg by mouth Daily., Disp: , Rfl:   •  metoprolol succinate XL (TOPROL-XL) 50 MG 24 hr tablet, Take 50 mg by mouth Daily., Disp: , Rfl:   •  OLANZapine (zyPREXA) 2.5 MG tablet, olanzapine 2.5 mg tablet, Disp: , Rfl:   •  pantoprazole (PROTONIX) 40 MG EC tablet, Take 1 tablet by mouth Daily., Disp: 90 tablet, Rfl: 3  •  sucralfate (CARAFATE) 1 g tablet, Take 1 tablet by mouth 4 (Four) Times a Day., Disp: 120 tablet, Rfl: 11  •  hydrocortisone 2.5 % ointment, Apply 1 application topically to the appropriate area as directed 2 (Two) Times a Day., Disp: 30 g, Rfl: 11    ALLERGIES  Patient has no known allergies.    VITALS  Vitals:    10/11/21 1310   BP: 138/80   BP Location: Left arm   Patient Position: Sitting   Cuff Size: Large Adult   Weight: 98.3 kg (216 lb 12.8 oz)   Height: 170.2 cm (67\")       PHYSICAL EXAM  Debilities/Disabilities Identified: None  Emotional Behavior: Appropriate  Wt Readings from Last 3 Encounters:   10/11/21 98.3 kg (216 lb 12.8 oz)   08/17/21 98 kg (216 lb)   04/13/21 97.1 kg (214 lb)     Ht Readings from Last 1 Encounters:   10/11/21 170.2 cm (67\")     Body mass index is 33.96 kg/m².  Physical Exam  Constitutional:       Appearance: She is well-developed. She is not diaphoretic.   HENT:      Head: Normocephalic and atraumatic. "   Eyes:      General: No scleral icterus.     Conjunctiva/sclera: Conjunctivae normal.      Pupils: Pupils are equal, round, and reactive to light.   Neck:      Thyroid: No thyromegaly.   Cardiovascular:      Rate and Rhythm: Normal rate and regular rhythm.      Heart sounds: Normal heart sounds. No murmur heard.  No gallop.    Pulmonary:      Effort: Pulmonary effort is normal.      Breath sounds: Normal breath sounds. No wheezing or rales.   Abdominal:      General: Bowel sounds are normal. There is no distension or abdominal bruit.      Palpations: Abdomen is soft. There is no shifting dullness, fluid wave or mass.      Tenderness: There is abdominal tenderness in the epigastric area. There is no guarding. Negative signs include Bhatia's sign.      Hernia: There is no hernia in the ventral area.   Musculoskeletal:         General: Normal range of motion.      Cervical back: Normal range of motion and neck supple.   Lymphadenopathy:      Cervical: No cervical adenopathy.   Skin:     General: Skin is warm and dry.      Findings: No erythema or rash.   Neurological:      Mental Status: She is alert and oriented to person, place, and time.   Psychiatric:         Mood and Affect: Mood normal.         Behavior: Behavior normal.         CLINICAL DATA REVIEWED   reviewed previous lab results and integrated with today's visit, reviewed notes from other physicians and/or last GI encounter, reviewed previous endoscopy results and available photos, reviewed surgical pathology results from previous biopsies    ASSESSMENT  Diagnoses and all orders for this visit:    Personal history of colonic polyps    Hiatal hernia    Aneudy ulcer, chronic    Esophageal stricture    Rectal bleeding    Other orders  -     Chlorcyclizine-Pseudoephed (Stahist AD) 25-60 MG tablet; Every 8 (Eight) Hours.  -     OLANZapine (zyPREXA) 2.5 MG tablet; olanzapine 2.5 mg tablet  -     hydrocortisone 2.5 % ointment; Apply 1 application topically to the  appropriate area as directed 2 (Two) Times a Day.          PLAN  Treat bleeding as anorectal and stick with DailyPPI and PRN Sucralfate, her Screening colon is up in 2023    Return if symptoms worsen or fail to improve.    I have discussed the above plan with the patient.  They verbalize understanding and are in agreement with the plan.  They have been advised to contact the office for any questions, concerns, or changes related to their health.

## 2021-10-21 ENCOUNTER — APPOINTMENT (OUTPATIENT)
Dept: WOMENS IMAGING | Facility: HOSPITAL | Age: 71
End: 2021-10-21

## 2021-10-21 PROCEDURE — 77067 SCR MAMMO BI INCL CAD: CPT | Performed by: RADIOLOGY

## 2021-10-21 PROCEDURE — 77063 BREAST TOMOSYNTHESIS BI: CPT | Performed by: RADIOLOGY

## 2022-11-09 ENCOUNTER — APPOINTMENT (OUTPATIENT)
Dept: SLEEP MEDICINE | Facility: HOSPITAL | Age: 72
End: 2022-11-09

## 2022-11-16 ENCOUNTER — OFFICE VISIT (OUTPATIENT)
Dept: SLEEP MEDICINE | Facility: HOSPITAL | Age: 72
End: 2022-11-16

## 2022-11-16 VITALS
SYSTOLIC BLOOD PRESSURE: 126 MMHG | WEIGHT: 210 LBS | HEART RATE: 82 BPM | HEIGHT: 67 IN | BODY MASS INDEX: 32.96 KG/M2 | DIASTOLIC BLOOD PRESSURE: 80 MMHG

## 2022-11-16 DIAGNOSIS — G47.33 OBSTRUCTIVE SLEEP APNEA: Primary | ICD-10-CM

## 2022-11-16 DIAGNOSIS — E66.9 OBESITY (BMI 30-39.9): ICD-10-CM

## 2022-11-16 PROCEDURE — G0463 HOSPITAL OUTPT CLINIC VISIT: HCPCS

## 2022-11-16 NOTE — PROGRESS NOTES
[facility] [place] SLEEP MEDICINE  [address]  [place] [address]  [phone]    PCP: Evin Major MD    Reason for visit:  Sleep disorders: CARLITOS    Gisella is a 71 y.o.female who was seen in the Sleep Disorders Center today. Annual fu. She is compliant and needs new supplies. She sleeps from 11pm to 8am. Using hybrid ffm - pawan view. She wants to switch mask styles. She does not notice much difference with CPAP but is compliant.  [place] Sleepiness Scale is dnc. Caffeine 5 per day. Alcohol 0 per week.    Gisella  reports that she quit smoking about 16 years ago. Her smoking use included cigarettes. She has never used smokeless tobacco.    Pertinent Positive Review of Systems of nasal congestion, dry mouth, anxiety  Rest of Review of Systems was negative as recorded in Sleep Questionnaire.    Patient  has a past medical history of Anxiety, Colon polyp, Hiatal hernia, History of ear infections, Hyperlipidemia, Hypertension, and Pneumonia.     Current Medications:    Current Outpatient Medications:   •  Chlorcyclizine-Pseudoephed (Stahist AD) 25-60 MG tablet, Every 8 (Eight) Hours., Disp: , Rfl:   •  hydrocortisone 2.5 % ointment, Apply 1 application topically to the appropriate area as directed 2 (Two) Times a Day., Disp: 30 g, Rfl: 11  •  indapamide (LOZOL) 1.25 MG tablet, Take 1.25 mg by mouth Every Morning., Disp: , Rfl:   •  losartan (COZAAR) 100 MG tablet, Take 100 mg by mouth Daily., Disp: , Rfl:   •  meloxicam (MOBIC) 15 MG tablet, Take 15 mg by mouth Daily., Disp: , Rfl:   •  metoprolol succinate XL (TOPROL-XL) 50 MG 24 hr tablet, Take 50 mg by mouth Daily., Disp: , Rfl:   •  OLANZapine (zyPREXA) 2.5 MG tablet, olanzapine 2.5 mg tablet, Disp: , Rfl:   •  pantoprazole (PROTONIX) 40 MG EC tablet, Take 1 tablet by mouth Daily., Disp: 90 tablet, Rfl: 3  •  sucralfate (CARAFATE) 1 g tablet, Take 1 tablet by mouth 4 (Four) Times a Day., Disp: 120 tablet, Rfl: 11   also entered in Sleep  "Questionnaire         Vital Signs: /80   Pulse 82   Ht 170.2 cm (67\")   Wt 95.3 kg (210 lb)   BMI 32.89 kg/m²     Body mass index is 32.89 kg/m².       Tongue: Large       Dentition: good       Pharynx: Posterior pharyngeal pillars are unable to see   Mallampatti: IV (only hard palate visible)        General: Alert. Cooperative. Well developed. No acute distress.             Head:  Normocephalic. Symmetrical. Atraumatic.              Nose: No septal deviation. No drainage.          Throat: No oral lesions. No thrush. Moist mucous membranes.    Chest Wall:  Normal shape. Symmetric expansion with respiration. No tenderness.             Neck:  Trachea midline.           Lungs:  Clear to auscultation bilaterally. No wheezes. No rhonchi. No rales. Respirations regular, even and unlabored.            Heart:  Regular rhythm and normal rate. Normal S1 and S2. No murmur.     Abdomen:  Soft, non-tender and non-distended. Normal bowel sounds. No masses.  Extremities:  Moves all extremities well. No edema.    Psychiatric: Normal mood and affect.    Diagnostic data available to date is as below and was reviewed on current visit:  • 9/4/20  The patient had 158 obstructive events and 367 partial obstructive events. AHI for total sleep time is 59.    Most current available usage data reviewed on 11/16/2022:  · 91% compliance average 6 hours 54 minutes AHI 3.6 at pressure 13 cm    DME Company: [employer]    Prescription to [facility] [place] for replacement supplies as below:    full face mask      Description Replacement    Nasal PILLOWS      A 7034 Nasal Pillows  every 3 mth    A 7033 Repl Nasal Pillows  2 per mth    Nasal MASK/CUSHION      A 7034 Nasal Mask/Cushion  every 3 mth    A 7032 Repl Nasal Mask/Cushion  2 per mth    Full Face MASK     x A 7030 Full Face Mask  every 3 mth   x A 7031 Repl Face Mask  1 per mth      A 4604 Heated Tubing  every 3 mth    A 7037 Standard Tubing  every 3 mth   x A 7035 Headgear  every 3 mth   x A 7046 " Repl Humidifier Chamber  every 6 yrs   x A 7038 Disposable Filters  2 per mth   x A 7039 Non-disposable Filter  every 6 mth   x A 7036 Chin Strap  every 6 mth         No orders of the defined types were placed in this encounter.         Impression:  1. Obstructive sleep apnea    2. Obesity (BMI 30-39.9)        Plan:  Gisella requires new supplies and a prescription was sent to [facility] today.  I reminded her that the mask needs to be changed at least every 3 months, and hose and straps every 6 months.  She is otherwise compliant with the device and was advised to continue regular usage given the severity of her underlying sleep disordered breathing.  I offered to adjust the pressures on her CPAP device upwards to see if she would feel a little better.  However she says this has been tried in the past and she was intolerant.  Therefore keep pressures the same    I reiterated the importance of effective treatment of obstructive sleep apnea with PAP machine.  Cardiovascular health risks of untreated sleep apnea were again reviewed.  Patient was asked to remain cautious if there is persistent hypersomnolence. The benefit of weight loss in reducing severity of obstructive sleep apnea was discussed.  Patient would benefit from adhering to a strict diet to achieve ideal BMI.     Change of PAP supplies regularly is important for effective use.  Change of cushion on the mask or plugs on nasal pillows along with disposable filters once every month and change of mask frame, tubing, headgear and Velcro straps every 6 months at the minimum was reiterated.    This patient is compliant with PAP machine and benefits from its use.  Apnea hypopneas index is corrected/improved.      Patient will follow up in this clinic in 1 year aprn    Thank you for allowing me to participate in your patient's care.    Electronically signed by Orlando Reyes MD, 11/16/22, 11:28 AM EST.    Part of this note may be an electronic transcription/translation  of spoken language to printed text using the Dragon Dictation System.

## 2023-11-15 ENCOUNTER — OFFICE VISIT (OUTPATIENT)
Dept: SLEEP MEDICINE | Facility: HOSPITAL | Age: 73
End: 2023-11-15
Payer: MEDICARE

## 2023-11-15 VITALS
OXYGEN SATURATION: 95 % | HEIGHT: 67 IN | DIASTOLIC BLOOD PRESSURE: 78 MMHG | WEIGHT: 218 LBS | HEART RATE: 84 BPM | BODY MASS INDEX: 34.21 KG/M2 | SYSTOLIC BLOOD PRESSURE: 144 MMHG

## 2023-11-15 DIAGNOSIS — G47.33 OBSTRUCTIVE SLEEP APNEA: Primary | ICD-10-CM

## 2023-11-15 DIAGNOSIS — E66.9 OBESITY (BMI 30-39.9): ICD-10-CM

## 2023-11-15 PROCEDURE — G0463 HOSPITAL OUTPT CLINIC VISIT: HCPCS

## 2023-11-15 NOTE — PROGRESS NOTES
[facility]  Telephone: [phone] / Fax: [phone] [place]  Telephone: [phone] / Fax: [phone] [employer]    PCP: Evin Major MD    Reason for visit: CARLITOS f/u    Gisella Alaniz is a 72 y.o.female  was last seen at [facility] sleep lab in November 2022. She uses hybrid ffm - pawan view.   She is inquiring about nasal mask. She is willing to try new styles. Her mask does leak. She has only replaced the cushion once over past year.  She feels that sleep quality has improved since CPAP started. She has significant dry mouth. Her sleep schedule is 11:30pm-8am. ESS is 5.    SH- no tobacco, 4 caffeine per day    ROS- negative.    DME [facility]    Current Medications:    Current Outpatient Medications:     Chlorcyclizine-Pseudoephed (Stahist AD) 25-60 MG tablet, Every 8 (Eight) Hours., Disp: , Rfl:     hydrocortisone 2.5 % ointment, Apply 1 application topically to the appropriate area as directed 2 (Two) Times a Day., Disp: 30 g, Rfl: 11    indapamide (LOZOL) 1.25 MG tablet, Take 1.25 mg by mouth Every Morning., Disp: , Rfl:     losartan (COZAAR) 100 MG tablet, Take 100 mg by mouth Daily., Disp: , Rfl:     meloxicam (MOBIC) 15 MG tablet, Take 15 mg by mouth Daily., Disp: , Rfl:     metoprolol succinate XL (TOPROL-XL) 50 MG 24 hr tablet, Take 50 mg by mouth Daily., Disp: , Rfl:     OLANZapine (zyPREXA) 2.5 MG tablet, olanzapine 2.5 mg tablet, Disp: , Rfl:     pantoprazole (PROTONIX) 40 MG EC tablet, Take 1 tablet by mouth Daily., Disp: 90 tablet, Rfl: 3    sucralfate (CARAFATE) 1 g tablet, Take 1 tablet by mouth 4 (Four) Times a Day., Disp: 120 tablet, Rfl: 11   also entered in Sleep Questionnaire    Patient  has a past medical history of Anxiety, Colon polyp, Hiatal hernia, History of ear infections, Hyperlipidemia, Hypertension, and Pneumonia.    I have reviewed the Past Medical History, Past Surgical History, Social History and Family History.    Vital Signs /78   Pulse 84  "  Ht 170.2 cm (67\")   Wt 98.9 kg (218 lb)   SpO2 95%   BMI 34.14 kg/m²  Body mass index is 34.14 kg/m².    General Alert and oriented. No acute distress noted   Pharynx/Throat Class  IV  Mallampati airway, large tongue, no evidence of redundant lateral pharyngeal tissue. No oral lesions. No thrush. Moist mucous membranes.   Head Normocephalic. Symmetrical. Atraumatic.    Nose No septal deviation. No drainage   Chest Wall Normal shape. Symmetric expansion with respiration. No tenderness.   Neck Trachea midline, no thyromegaly or adenopathy    Lungs Clear to auscultation bilaterally. No wheezes. No rhonchi. No rales. Respirations regular, even and unlabored.   Heart Regular rhythm and normal rate. Normal S1 and S2. No murmur   Abdomen Soft, non-tender and non-distended. Normal bowel sounds. No masses.   Extremities Moves all extremities well. No edema   Psychiatric Normal mood and affect.     Testing:  Download 100% use with average nightly use of 8  hours and 30 minutes on CPAP 13cm H2O, AHI 5.5    Study-Diagnostic data available to date is as below and was reviewed on current visit:  9/4/20  The patient had 158 obstructive events and 367 partial obstructive events. AHI for total sleep time is 59.    Impression:  1. Obstructive sleep apnea    2. Obesity (BMI 30-39.9)          Plan:  Send order to DME to renew all accessories and order mask fitting.  She tried higher pressures in the past, and was unable to tolerate. I therefore kept her pressures at 13cm H2O. Her mask is old/leaks. Need refitting and replacement.     Patient uses the CPAP device and benefits from its use in terms of reduction of hypersomnia and snoring.  I reviewed download report and original sleep study report with the patient. I advised pt to contact us if PAP pressures feel excessive or insufficient.    Weight loss will be strongly beneficial to reduce the severity of sleep-disordered breathing.  Caution during activities that require " prolonged concentration is strongly advised if sleepiness returns.     Follow up with Dr. Reyes in one year    Thank you for allowing me to participate in your patient's care.      MAISHA Renee  [facility]  Phone: [phone]      Part of this note may be an electronic transcription/translation of spoken language to printed text using the Dragon Dictation System.

## 2024-08-15 ENCOUNTER — TRANSCRIBE ORDERS (OUTPATIENT)
Dept: ADMINISTRATIVE | Facility: HOSPITAL | Age: 74
End: 2024-08-15
Payer: MEDICARE

## 2024-08-15 ENCOUNTER — HOSPITAL ENCOUNTER (OUTPATIENT)
Dept: ULTRASOUND IMAGING | Facility: HOSPITAL | Age: 74
Discharge: HOME | End: 2024-08-15
Admitting: INTERNAL MEDICINE
Payer: MEDICARE

## 2024-08-15 DIAGNOSIS — R60.0 LEG EDEMA, LEFT: Primary | ICD-10-CM

## 2024-08-15 DIAGNOSIS — R60.0 LEG EDEMA, LEFT: ICD-10-CM

## 2024-08-15 PROCEDURE — 93971 EXTREMITY STUDY: CPT

## 2024-11-18 ENCOUNTER — OFFICE VISIT (OUTPATIENT)
Dept: SLEEP MEDICINE | Facility: HOSPITAL | Age: 74
End: 2024-11-18
Payer: MEDICARE

## 2024-11-18 VITALS
BODY MASS INDEX: 34.06 KG/M2 | HEART RATE: 81 BPM | DIASTOLIC BLOOD PRESSURE: 72 MMHG | SYSTOLIC BLOOD PRESSURE: 120 MMHG | WEIGHT: 217 LBS | OXYGEN SATURATION: 96 % | HEIGHT: 67 IN

## 2024-11-18 DIAGNOSIS — E66.9 OBESITY (BMI 30-39.9): ICD-10-CM

## 2024-11-18 DIAGNOSIS — G47.33 OBSTRUCTIVE SLEEP APNEA, ADULT: Primary | ICD-10-CM

## 2024-11-18 PROCEDURE — G0463 HOSPITAL OUTPT CLINIC VISIT: HCPCS

## 2024-11-18 NOTE — PROGRESS NOTES
"[facility] [place] [facility]  [address]  [place] [address]  [phone]    PCP: Evin Major MD    Reason for visit:  Sleep disorders: CARLITOS    Gisella is a 73 y.o.female who was seen in the Sleep Disorders Center today. Annual fu. She sleeps from 11:30pm to 8am. Wakes up rested. Somewhat sleepy in afternoon.  She uses a fullface nontraditional mask that is comfortable.  [place] Sleepiness Scale is dnc. Caffeine 3-4 per day. Alcohol 0 per week.    Gisella  reports that she quit smoking about 18 years ago. Her smoking use included cigarettes. She has never used smokeless tobacco.    Pertinent Positive Review of Systems of denies  Rest of Review of Systems was negative as recorded in Sleep Questionnaire.    Patient  has a past medical history of Anxiety, Colon polyp, Hiatal hernia, History of ear infections, Hyperlipidemia, Hypertension, and Pneumonia.     Current Medications:    Current Outpatient Medications:     Chlorcyclizine-Pseudoephed (Stahist AD) 25-60 MG tablet, Every 8 (Eight) Hours., Disp: , Rfl:     hydrocortisone 2.5 % ointment, Apply 1 application topically to the appropriate area as directed 2 (Two) Times a Day., Disp: 30 g, Rfl: 11    indapamide (LOZOL) 1.25 MG tablet, Take 1.25 mg by mouth Every Morning., Disp: , Rfl:     losartan (COZAAR) 100 MG tablet, Take 100 mg by mouth Daily., Disp: , Rfl:     meloxicam (MOBIC) 15 MG tablet, Take 15 mg by mouth Daily., Disp: , Rfl:     metoprolol succinate XL (TOPROL-XL) 50 MG 24 hr tablet, Take 50 mg by mouth Daily., Disp: , Rfl:     OLANZapine (zyPREXA) 2.5 MG tablet, olanzapine 2.5 mg tablet, Disp: , Rfl:     pantoprazole (PROTONIX) 40 MG EC tablet, Take 1 tablet by mouth Daily., Disp: 90 tablet, Rfl: 3    sucralfate (CARAFATE) 1 g tablet, Take 1 tablet by mouth 4 (Four) Times a Day., Disp: 120 tablet, Rfl: 11   also entered in Sleep Questionnaire         Vital Signs: /72   Pulse 81   Ht 170.2 cm (67.01\")   Wt 98.4 kg " "(217 lb)   SpO2 96%   BMI 33.98 kg/m²     Body mass index is 33.98 kg/m².       Tongue: Large       Dentition: good       Pharynx: Posterior pharyngeal pillars are narrow   Mallampatti: IV (only hard palate visible)        General: Alert. Cooperative. Well developed. No acute distress.             Head:  Normocephalic. Symmetrical. Atraumatic.              Nose: No septal deviation. No drainage.          Throat: No oral lesions. No thrush. Moist mucous membranes.    Chest Wall:  Normal shape. Symmetric expansion with respiration. No tenderness.             Neck:  Trachea midline.           Lungs:  Clear to auscultation bilaterally. No wheezes. No rhonchi. No rales. Respirations regular, even and unlabored.            Heart:  Regular rhythm and normal rate. Normal S1 and S2. No murmur.     Abdomen:  Soft, non-tender and non-distended. Normal bowel sounds. No masses.  Extremities:  Moves all extremities well. No edema.    Psychiatric: Normal mood and affect.    Diagnostic data available to date is as below and was reviewed on current visit:  9/4/20: The patient had 158 obstructive events and 367 partial obstructive events. AHI for total sleep time is 59.    No results found for: \"IRON\", \"TIBC\", \"FERRITIN\"    Most current available usage data reviewed on 11/18/2024:  No scans are attached to the encounter or orders placed in the encounter.  97% compliance average 8-1/2 hours AHI 4.2 set pressure is 9 cm.    DME Company: [employer]    Prescription to [facility] for replacement supplies as below:    full face mask      Description Replacement    Nasal PILLOWS      A 7034 Nasal Pillows  every 3 mth    A 7033 Repl Nasal Pillows  2 per mth    Nasal MASK/CUSHION      A 7034 Nasal Mask/Cushion  every 3 mth    A 7032 Repl Nasal Mask/Cushion  2 per mth    Full Face MASK     x A 7030 Full Face Mask  every 3 mth   x A 7031 Repl Face Mask  1 per mth      A 4604 Heated Tubing  every 3 mth    A 7037 Standard Tubing  every 3 mth   x A 7035 " Headgear  every 3 mth   x A 7046 Repl Humidifier Chamber  every 6 yrs   x A 7038 Disposable Filters  2 per mth   x A 7039 Non-disposable Filter  every 6 mth   x A 7036 Chin Strap  every 6 mth     No orders of the defined types were placed in this encounter.         Impression:  1. Obstructive sleep apnea, adult    2. Obesity (BMI 30-39.9)        Plan:  Gisella is doing very well with her CPAP machine.  She wakes up rested and refreshed.  Keep at current pressure settings and reminded to replace supplies regularly.     I reiterated the importance of effective treatment of obstructive sleep apnea with PAP machine.  Cardiovascular health risks of untreated sleep apnea were again reviewed.  Patient was asked to remain cautious if there is persistent hypersomnolence. The benefit of weight loss in reducing severity of obstructive sleep apnea was discussed.  Patient would benefit from adhering to a strict diet to achieve ideal BMI.     Change of PAP supplies regularly is important for effective use.  Change of cushion on the mask or plugs on nasal pillows along with disposable filters once every month and change of mask frame, tubing, headgear and Velcro straps every 6 months at the minimum was reiterated.    This patient is compliant with PAP machine and benefits from its use.  Apnea hypopneas index is corrected/improved.  Daytime hypersomnolence has resolved.     Patient will follow up in this clinic in 1 year APRN    Thank you for allowing me to participate in your patient's care.    Electronically signed by Orlando Reyes MD, 11/18/24, 10:15 AM EST.    Part of this note may be an electronic transcription/translation of spoken language to printed text using the Dragon Dictation System.

## (undated) DEVICE — SPNG GZ WOVN 4X4IN 12PLY 10/BX STRL

## (undated) DEVICE — MEDI-VAC YANK SUCT HNDL W/TPRD BULBOUS TIP: Brand: CARDINAL HEALTH

## (undated) DEVICE — SNAR POLYP CAPTIFLX WD OVL 27MM 240CM

## (undated) DEVICE — SYR LL 3CC

## (undated) DEVICE — Device

## (undated) DEVICE — BW-412T DISP COMBO CLEANING BRUSH: Brand: SINGLE USE COMBINATION CLEANING BRUSH

## (undated) DEVICE — SUCTION CANISTER, 3000CC,SAFELINER: Brand: DEROYAL

## (undated) DEVICE — SUCTION CANISTER, 1000CC,SAFELINER: Brand: DEROYAL

## (undated) DEVICE — VIAL FORMALIN CAP 10P 40ML

## (undated) DEVICE — GLV SURG SENSICARE PI MIC PF SZ7.5 LF STRL

## (undated) DEVICE — THE BITE BLOCK MAXI, LATEX FREE STRAP IS USED TO PROTECT THE ENDOSCOPE INSERTION TUBE FROM BEING BITTEN BY THE PATIENT.

## (undated) DEVICE — FRCP BX RADJAW4 NDL 2.8 240CM LG OG BX40

## (undated) DEVICE — DEV INFL ALLIANCE2 SYS

## (undated) DEVICE — MEDI-VAC NON-CONDUCTIVE SUCTION TUBING: Brand: CARDINAL HEALTH

## (undated) DEVICE — JACKT LAB F/R KNIT CUFF/COLR XLG BLU

## (undated) DEVICE — ESOPHAGEAL BALLOON DILATATION CATHETER: Brand: CRE FIXED WIRE

## (undated) DEVICE — KT ORCA ORCAPOD DISP STRL